# Patient Record
Sex: MALE | Race: WHITE | NOT HISPANIC OR LATINO | Employment: UNEMPLOYED | URBAN - METROPOLITAN AREA
[De-identification: names, ages, dates, MRNs, and addresses within clinical notes are randomized per-mention and may not be internally consistent; named-entity substitution may affect disease eponyms.]

---

## 2017-01-23 ENCOUNTER — COMMUNICATION - HEALTHEAST (OUTPATIENT)
Dept: FAMILY MEDICINE | Facility: CLINIC | Age: 52
End: 2017-01-23

## 2017-02-09 ENCOUNTER — COMMUNICATION - HEALTHEAST (OUTPATIENT)
Dept: FAMILY MEDICINE | Facility: CLINIC | Age: 52
End: 2017-02-09

## 2017-02-09 DIAGNOSIS — J45.909 ASTHMA IN ADULT: ICD-10-CM

## 2017-02-14 ENCOUNTER — COMMUNICATION - HEALTHEAST (OUTPATIENT)
Dept: FAMILY MEDICINE | Facility: CLINIC | Age: 52
End: 2017-02-14

## 2017-02-14 DIAGNOSIS — J45.909 ASTHMA IN ADULT: ICD-10-CM

## 2017-02-16 ENCOUNTER — COMMUNICATION - HEALTHEAST (OUTPATIENT)
Dept: FAMILY MEDICINE | Facility: CLINIC | Age: 52
End: 2017-02-16

## 2017-02-16 DIAGNOSIS — J45.909 ASTHMA IN ADULT: ICD-10-CM

## 2017-02-23 ENCOUNTER — COMMUNICATION - HEALTHEAST (OUTPATIENT)
Dept: FAMILY MEDICINE | Facility: CLINIC | Age: 52
End: 2017-02-23

## 2017-03-09 ENCOUNTER — OFFICE VISIT - HEALTHEAST (OUTPATIENT)
Dept: FAMILY MEDICINE | Facility: CLINIC | Age: 52
End: 2017-03-09

## 2017-03-09 ENCOUNTER — COMMUNICATION - HEALTHEAST (OUTPATIENT)
Dept: TELEHEALTH | Facility: CLINIC | Age: 52
End: 2017-03-09

## 2017-03-09 DIAGNOSIS — J45.909 ASTHMA IN ADULT: ICD-10-CM

## 2017-03-09 DIAGNOSIS — Z51.81 MEDICATION MONITORING ENCOUNTER: ICD-10-CM

## 2017-03-09 DIAGNOSIS — J44.89 CHRONIC AIRWAY OBSTRUCTION, NOT ELSEWHERE CLASSIFIED: ICD-10-CM

## 2017-03-09 DIAGNOSIS — F31.70 BIPOLAR AFFECTIVE DISORDER IN REMISSION (H): ICD-10-CM

## 2017-03-09 ASSESSMENT — MIFFLIN-ST. JEOR: SCORE: 1541.86

## 2017-03-10 ENCOUNTER — COMMUNICATION - HEALTHEAST (OUTPATIENT)
Dept: FAMILY MEDICINE | Facility: CLINIC | Age: 52
End: 2017-03-10

## 2017-03-15 ENCOUNTER — COMMUNICATION - HEALTHEAST (OUTPATIENT)
Dept: FAMILY MEDICINE | Facility: CLINIC | Age: 52
End: 2017-03-15

## 2017-03-15 ENCOUNTER — COMMUNICATION - HEALTHEAST (OUTPATIENT)
Dept: SCHEDULING | Facility: CLINIC | Age: 52
End: 2017-03-15

## 2017-04-05 ENCOUNTER — OFFICE VISIT - HEALTHEAST (OUTPATIENT)
Dept: FAMILY MEDICINE | Facility: CLINIC | Age: 52
End: 2017-04-05

## 2017-04-05 DIAGNOSIS — J44.89 CHRONIC AIRWAY OBSTRUCTION, NOT ELSEWHERE CLASSIFIED: ICD-10-CM

## 2017-04-05 DIAGNOSIS — D72.829 ELEVATED WBC COUNT: ICD-10-CM

## 2017-04-05 DIAGNOSIS — S06.0X1D CONCUSSION, WITH LOSS OF CONSCIOUSNESS OF 30 MINUTES OR LESS, SUBSEQUENT ENCOUNTER: ICD-10-CM

## 2017-04-05 DIAGNOSIS — F41.9 ANXIETY: ICD-10-CM

## 2017-04-05 DIAGNOSIS — J45.909 ASTHMA IN ADULT: ICD-10-CM

## 2017-04-05 DIAGNOSIS — R51.9 HEADACHE: ICD-10-CM

## 2017-04-05 DIAGNOSIS — F31.70 BIPOLAR AFFECTIVE DISORDER IN REMISSION (H): ICD-10-CM

## 2017-04-05 LAB
CHOLEST SERPL-MCNC: 226 MG/DL
FASTING STATUS PATIENT QL REPORTED: YES
HDLC SERPL-MCNC: 59 MG/DL
LDLC SERPL CALC-MCNC: 119 MG/DL
TRIGL SERPL-MCNC: 239 MG/DL

## 2017-04-05 ASSESSMENT — MIFFLIN-ST. JEOR: SCORE: 1554.33

## 2017-07-19 ENCOUNTER — COMMUNICATION - HEALTHEAST (OUTPATIENT)
Dept: FAMILY MEDICINE | Facility: CLINIC | Age: 52
End: 2017-07-19

## 2017-08-15 ENCOUNTER — COMMUNICATION - HEALTHEAST (OUTPATIENT)
Dept: FAMILY MEDICINE | Facility: CLINIC | Age: 52
End: 2017-08-15

## 2017-08-15 DIAGNOSIS — J44.89 CHRONIC AIRWAY OBSTRUCTION, NOT ELSEWHERE CLASSIFIED: ICD-10-CM

## 2017-12-08 ENCOUNTER — RECORDS - HEALTHEAST (OUTPATIENT)
Dept: GENERAL RADIOLOGY | Facility: CLINIC | Age: 52
End: 2017-12-08

## 2017-12-08 ENCOUNTER — COMMUNICATION - HEALTHEAST (OUTPATIENT)
Dept: FAMILY MEDICINE | Facility: CLINIC | Age: 52
End: 2017-12-08

## 2017-12-08 ENCOUNTER — OFFICE VISIT - HEALTHEAST (OUTPATIENT)
Dept: FAMILY MEDICINE | Facility: CLINIC | Age: 52
End: 2017-12-08

## 2017-12-08 DIAGNOSIS — R05.9 COUGH: ICD-10-CM

## 2017-12-08 DIAGNOSIS — J45.909 ASTHMA IN ADULT: ICD-10-CM

## 2017-12-26 ENCOUNTER — COMMUNICATION - HEALTHEAST (OUTPATIENT)
Dept: FAMILY MEDICINE | Facility: CLINIC | Age: 52
End: 2017-12-26

## 2017-12-26 DIAGNOSIS — J45.909 ASTHMA IN ADULT: ICD-10-CM

## 2018-02-15 ENCOUNTER — RECORDS - HEALTHEAST (OUTPATIENT)
Dept: ADMINISTRATIVE | Facility: OTHER | Age: 53
End: 2018-02-15

## 2018-03-08 ENCOUNTER — COMMUNICATION - HEALTHEAST (OUTPATIENT)
Dept: FAMILY MEDICINE | Facility: CLINIC | Age: 53
End: 2018-03-08

## 2018-03-08 DIAGNOSIS — J45.909 ASTHMA: ICD-10-CM

## 2018-05-29 ENCOUNTER — COMMUNICATION - HEALTHEAST (OUTPATIENT)
Dept: FAMILY MEDICINE | Facility: CLINIC | Age: 53
End: 2018-05-29

## 2018-05-29 DIAGNOSIS — J45.909 ASTHMA: ICD-10-CM

## 2018-07-27 ENCOUNTER — COMMUNICATION - HEALTHEAST (OUTPATIENT)
Dept: FAMILY MEDICINE | Facility: CLINIC | Age: 53
End: 2018-07-27

## 2018-08-14 ENCOUNTER — COMMUNICATION - HEALTHEAST (OUTPATIENT)
Dept: FAMILY MEDICINE | Facility: CLINIC | Age: 53
End: 2018-08-14

## 2018-11-08 ENCOUNTER — COMMUNICATION - HEALTHEAST (OUTPATIENT)
Dept: FAMILY MEDICINE | Facility: CLINIC | Age: 53
End: 2018-11-08

## 2018-11-08 DIAGNOSIS — J45.909 ASTHMA: ICD-10-CM

## 2018-11-09 ENCOUNTER — COMMUNICATION - HEALTHEAST (OUTPATIENT)
Dept: FAMILY MEDICINE | Facility: CLINIC | Age: 53
End: 2018-11-09

## 2018-11-09 DIAGNOSIS — J45.909 ASTHMA: ICD-10-CM

## 2018-11-23 ENCOUNTER — COMMUNICATION - HEALTHEAST (OUTPATIENT)
Dept: FAMILY MEDICINE | Facility: CLINIC | Age: 53
End: 2018-11-23

## 2018-11-23 DIAGNOSIS — J45.909 ASTHMA IN ADULT: ICD-10-CM

## 2018-11-23 DIAGNOSIS — J45.909 ASTHMA: ICD-10-CM

## 2018-11-26 ENCOUNTER — OFFICE VISIT - HEALTHEAST (OUTPATIENT)
Dept: FAMILY MEDICINE | Facility: CLINIC | Age: 53
End: 2018-11-26

## 2018-11-26 DIAGNOSIS — J31.0 CHRONIC RHINITIS: ICD-10-CM

## 2018-11-26 DIAGNOSIS — F31.70 BIPOLAR AFFECTIVE DISORDER IN REMISSION (H): ICD-10-CM

## 2018-11-26 DIAGNOSIS — I10 ESSENTIAL HYPERTENSION: ICD-10-CM

## 2018-11-26 DIAGNOSIS — J45.909 ASTHMA: ICD-10-CM

## 2018-11-26 DIAGNOSIS — J44.9 CHRONIC OBSTRUCTIVE PULMONARY DISEASE, UNSPECIFIED COPD TYPE (H): ICD-10-CM

## 2018-11-26 RX ORDER — ALBUTEROL SULFATE 90 UG/1
2 AEROSOL, METERED RESPIRATORY (INHALATION) EVERY 6 HOURS PRN
Qty: 18 G | Refills: 5 | Status: SHIPPED | OUTPATIENT
Start: 2018-11-26 | End: 2022-05-19

## 2018-11-26 ASSESSMENT — MIFFLIN-ST. JEOR: SCORE: 1609.61

## 2018-12-16 ENCOUNTER — COMMUNICATION - HEALTHEAST (OUTPATIENT)
Dept: FAMILY MEDICINE | Facility: CLINIC | Age: 53
End: 2018-12-16

## 2018-12-16 DIAGNOSIS — J45.909 ASTHMA IN ADULT: ICD-10-CM

## 2019-01-24 ENCOUNTER — COMMUNICATION - HEALTHEAST (OUTPATIENT)
Dept: SCHEDULING | Facility: CLINIC | Age: 54
End: 2019-01-24

## 2019-04-13 ENCOUNTER — COMMUNICATION - HEALTHEAST (OUTPATIENT)
Dept: FAMILY MEDICINE | Facility: CLINIC | Age: 54
End: 2019-04-13

## 2019-04-13 DIAGNOSIS — J45.909 ASTHMA: ICD-10-CM

## 2019-06-12 ENCOUNTER — COMMUNICATION - HEALTHEAST (OUTPATIENT)
Dept: FAMILY MEDICINE | Facility: CLINIC | Age: 54
End: 2019-06-12

## 2019-07-05 ENCOUNTER — COMMUNICATION - HEALTHEAST (OUTPATIENT)
Dept: SCHEDULING | Facility: CLINIC | Age: 54
End: 2019-07-05

## 2019-07-05 ENCOUNTER — COMMUNICATION - HEALTHEAST (OUTPATIENT)
Dept: FAMILY MEDICINE | Facility: CLINIC | Age: 54
End: 2019-07-05

## 2019-07-06 ENCOUNTER — COMMUNICATION - HEALTHEAST (OUTPATIENT)
Dept: SCHEDULING | Facility: CLINIC | Age: 54
End: 2019-07-06

## 2019-07-09 ENCOUNTER — OFFICE VISIT - HEALTHEAST (OUTPATIENT)
Dept: FAMILY MEDICINE | Facility: CLINIC | Age: 54
End: 2019-07-09

## 2019-07-09 DIAGNOSIS — J20.9 ACUTE BRONCHITIS, UNSPECIFIED ORGANISM: ICD-10-CM

## 2019-07-09 DIAGNOSIS — J45.909 ASTHMA IN ADULT: ICD-10-CM

## 2019-07-09 DIAGNOSIS — I10 ESSENTIAL HYPERTENSION: ICD-10-CM

## 2019-07-09 DIAGNOSIS — J44.1 CHRONIC OBSTRUCTIVE PULMONARY DISEASE WITH ACUTE EXACERBATION (H): ICD-10-CM

## 2019-07-09 RX ORDER — BUDESONIDE AND FORMOTEROL FUMARATE DIHYDRATE 160; 4.5 UG/1; UG/1
AEROSOL RESPIRATORY (INHALATION)
Qty: 1 INHALER | Refills: 5 | Status: SHIPPED | OUTPATIENT
Start: 2019-07-09 | End: 2022-05-19

## 2019-12-15 ENCOUNTER — COMMUNICATION - HEALTHEAST (OUTPATIENT)
Dept: FAMILY MEDICINE | Facility: CLINIC | Age: 54
End: 2019-12-15

## 2019-12-15 DIAGNOSIS — J45.909 ASTHMA: ICD-10-CM

## 2019-12-17 ENCOUNTER — COMMUNICATION - HEALTHEAST (OUTPATIENT)
Dept: FAMILY MEDICINE | Facility: CLINIC | Age: 54
End: 2019-12-17

## 2019-12-17 ENCOUNTER — COMMUNICATION - HEALTHEAST (OUTPATIENT)
Dept: SCHEDULING | Facility: CLINIC | Age: 54
End: 2019-12-17

## 2019-12-17 DIAGNOSIS — J45.909 ASTHMA IN ADULT: ICD-10-CM

## 2020-11-24 ENCOUNTER — OFFICE VISIT - HEALTHEAST (OUTPATIENT)
Dept: FAMILY MEDICINE | Facility: CLINIC | Age: 55
End: 2020-11-24

## 2020-11-24 DIAGNOSIS — Z02.4 ENCOUNTER FOR COMMERCIAL DRIVER MEDICAL EXAMINATION (CDME): ICD-10-CM

## 2020-11-24 DIAGNOSIS — H53.8 BLURRED VISION: ICD-10-CM

## 2020-11-24 DIAGNOSIS — I10 ESSENTIAL HYPERTENSION: ICD-10-CM

## 2020-11-24 DIAGNOSIS — J44.1 CHRONIC OBSTRUCTIVE PULMONARY DISEASE WITH ACUTE EXACERBATION (H): ICD-10-CM

## 2020-11-24 DIAGNOSIS — R82.90 ABNORMAL URINALYSIS: ICD-10-CM

## 2020-11-24 DIAGNOSIS — G47.33 OBSTRUCTIVE SLEEP APNEA (ADULT) (PEDIATRIC): ICD-10-CM

## 2020-11-24 LAB
ALBUMIN UR-MCNC: ABNORMAL MG/DL
APPEARANCE UR: CLEAR
BACTERIA #/AREA URNS HPF: ABNORMAL HPF
BILIRUB UR QL STRIP: ABNORMAL
COLOR UR AUTO: ABNORMAL
GLUCOSE UR STRIP-MCNC: NEGATIVE MG/DL
HGB UR QL STRIP: ABNORMAL
KETONES UR STRIP-MCNC: ABNORMAL MG/DL
LEUKOCYTE ESTERASE UR QL STRIP: NEGATIVE
MUCOUS THREADS #/AREA URNS LPF: ABNORMAL LPF
NITRATE UR QL: NEGATIVE
PH UR STRIP: 5.5 [PH] (ref 5–8)
RBC #/AREA URNS AUTO: ABNORMAL HPF
SP GR UR STRIP: 1.02 (ref 1–1.03)
SQUAMOUS #/AREA URNS AUTO: ABNORMAL LPF
UROBILINOGEN UR STRIP-ACNC: ABNORMAL
WBC #/AREA URNS AUTO: ABNORMAL HPF

## 2020-11-24 ASSESSMENT — MIFFLIN-ST. JEOR: SCORE: 1579.95

## 2020-11-26 LAB — BACTERIA SPEC CULT: NO GROWTH

## 2021-01-20 ENCOUNTER — COMMUNICATION - HEALTHEAST (OUTPATIENT)
Dept: FAMILY MEDICINE | Facility: CLINIC | Age: 56
End: 2021-01-20

## 2021-01-20 DIAGNOSIS — J45.909 ASTHMA IN ADULT: ICD-10-CM

## 2021-05-17 ENCOUNTER — OFFICE VISIT - HEALTHEAST (OUTPATIENT)
Dept: FAMILY MEDICINE | Facility: CLINIC | Age: 56
End: 2021-05-17

## 2021-05-17 DIAGNOSIS — J45.909 UNCOMPLICATED ASTHMA, UNSPECIFIED ASTHMA SEVERITY, UNSPECIFIED WHETHER PERSISTENT: ICD-10-CM

## 2021-05-17 DIAGNOSIS — J45.909 ASTHMA: ICD-10-CM

## 2021-05-17 DIAGNOSIS — I10 ESSENTIAL HYPERTENSION: ICD-10-CM

## 2021-05-17 RX ORDER — LISINOPRIL 10 MG/1
10 TABLET ORAL DAILY
Qty: 90 TABLET | Refills: 3 | Status: SHIPPED | OUTPATIENT
Start: 2021-05-17 | End: 2022-05-19

## 2021-05-17 RX ORDER — PREDNISONE 10 MG/1
TABLET ORAL
Qty: 30 TABLET | Refills: 0 | Status: SHIPPED | OUTPATIENT
Start: 2021-05-17 | End: 2022-05-19

## 2021-05-17 RX ORDER — ALBUTEROL SULFATE 0.83 MG/ML
SOLUTION RESPIRATORY (INHALATION)
Qty: 450 ML | Refills: 5 | Status: SHIPPED | OUTPATIENT
Start: 2021-05-17 | End: 2022-05-19

## 2021-05-17 ASSESSMENT — MIFFLIN-ST. JEOR: SCORE: 1553.2

## 2021-05-24 ENCOUNTER — RECORDS - HEALTHEAST (OUTPATIENT)
Dept: ADMINISTRATIVE | Facility: CLINIC | Age: 56
End: 2021-05-24

## 2021-05-27 ENCOUNTER — RECORDS - HEALTHEAST (OUTPATIENT)
Dept: ADMINISTRATIVE | Facility: CLINIC | Age: 56
End: 2021-05-27

## 2021-05-27 VITALS
HEIGHT: 67 IN | OXYGEN SATURATION: 96 % | WEIGHT: 167 LBS | HEART RATE: 108 BPM | DIASTOLIC BLOOD PRESSURE: 100 MMHG | SYSTOLIC BLOOD PRESSURE: 148 MMHG | BODY MASS INDEX: 26.21 KG/M2

## 2021-05-27 NOTE — TELEPHONE ENCOUNTER
Refill Approved    Rx renewed per Medication Renewal Policy. Medication was last renewed on 11/26/18.    Mary Santos, [facility] Triage/Med Refill 4/15/2019     Requested Prescriptions   Pending Prescriptions Disp Refills     albuterol (PROVENTIL) 2.5 mg /3 mL (0.083 %) nebulizer solution [Pharmacy Med Name: ALBUTEROL 0.083%(2.5MG/3ML) 25X3ML] 375 mL 0     Sig: NEBULIZE 1 VIAL EVERY 4 HOURS AS NEEDED       Albuterol/Levalbuterol Refill Protocol Passed - 4/13/2019  3:43 PM        Passed - PCP or prescribing provider visit in last year     Last office visit with prescriber/PCP: 11/26/2018 Nelida Ivory MD OR same dept: 11/26/2018 Nelida Ivory MD OR same specialty: 11/26/2018 Nelida Ivory MD Last physical: Visit date not found       Next appt within 3 mo: Visit date not found  Next physical within 3 mo: Visit date not found  Prescriber OR PCP: Nelida Ivory MD  Last diagnosis associated with med order: 1. Asthma  - albuterol (PROVENTIL) 2.5 mg /3 mL (0.083 %) nebulizer solution [Pharmacy Med Name: ALBUTEROL 0.083%(2.5MG/3ML) 25X3ML]; NEBULIZE 1 VIAL EVERY 4 HOURS AS NEEDED  Dispense: 375 mL; Refill: 0    If protocol passes may refill for 6 months if within 3 months of last provider visit (or a total of 9 months). If patient requesting >1 inhaler per month refill x 6 months and have patient make appointment with provider.

## 2021-05-28 ASSESSMENT — ASTHMA QUESTIONNAIRES: ACT_TOTALSCORE: 17

## 2021-05-29 NOTE — TELEPHONE ENCOUNTER
Medication Request  Medication name:     OLANZapine (ZYPREXA) 7.5 MG tablet (Discontinued) 30 tablet 1 4/5/2017 11/26/2018 No   Sig - Route: Take 1 tablet (7.5 mg total) by mouth at bedtime. - Oral   Reason for Discontinue: Therapy completed   E-Prescribing Status: Receipt confirmed by pharmacy (4/5/2017 10:13 AM CDT)       Pharmacy Name and Location: [facility] [address] [place] [address]   Reason for request: The patient would like the above prescription. The patient states that he is having a hard time affording the script so he has been cutting the medication in half. The patient would like an expedited refill request.   When did you use medication last?:  6/11/2019  Patient offered appointment:  The patient is willing to be seen though would like to have the refilled without if possible.   Okay to leave a detailed message: yes

## 2021-05-29 NOTE — TELEPHONE ENCOUNTER
Pt is due for appt. BP high last visit in Nov 2018. Nothing discussed about this medication. Tried to call without success. Line busy. Will try again later. H.DeGree, CMA

## 2021-05-30 ENCOUNTER — RECORDS - HEALTHEAST (OUTPATIENT)
Dept: ADMINISTRATIVE | Facility: CLINIC | Age: 56
End: 2021-05-30

## 2021-05-30 VITALS — WEIGHT: 169 LBS | BODY MASS INDEX: 27.16 KG/M2 | HEIGHT: 66 IN

## 2021-05-30 VITALS — HEIGHT: 66 IN | BODY MASS INDEX: 26.72 KG/M2 | WEIGHT: 166.25 LBS

## 2021-05-30 NOTE — PROGRESS NOTES
Assessment:        1. Acute bronchitis, unspecified organism  doxycycline (VIBRAMYCIN) 100 MG capsule   2. Chronic obstructive pulmonary disease with acute exacerbation (H)     3. Asthma in adult  budesonide-formoterol (SYMBICORT) 160-4.5 mcg/actuation inhaler    albuterol (PROVENTIL) 2.5 mg /3 mL (0.083 %) nebulizer solution             Plan:            We reviewed the likely/potential etiology(ies) for his respiratory symptoms and we will cover with doxycycline as directed, zyrtec daily when having nasal d/c, and I also sent a new Rx for symbicort to be used daily as directed. I also renewed his albuterol neb solution. We discussed the treatment goals of asthma and I stressed the importance of controller medication use and I gave him some information regarding prescription assistance. I also stressed the importance of regular follow up at least every 6-12 mos. We reviewed use of OTC analgesics as well as increased fluids and rest, and he will call or return to clinic with any ongoing or worsening symptoms. As far as his hypertension, he will continue the lisinopril and I would like him to follow up in the next 3-4 wks for BP check. We discussed the benefits of smoking cessation and the treatment options available. He will consider these and contact me if he needs help quitting. He will otherwise will f/u in  6-12 mos for routine med check/evaluation.         Subjective:          Onel Valdez is a 54 y.o. male with known COPD and asthma who presents with exacerbation. Current symptoms include: acute dyspnea and cough productive of yellow and green sputum in moderate amounts. Symptoms began 4 days ago. Patient currently is not on home oxygen therapy. He does not have insurance currently, so was reluctant to come in, but he is having difficulty getting to work due to the symptoms. Associated symptoms include fevers, poor exercise tolerance and clear nasal drainage. This episode appears to have been triggered by  no identifiable factor. Treatments tried for the current exacerbation: albuterol inhaler and albuterol nebulizer. The patient has been having similar episodes for several years. He does have intermittent allergy symptoms, and takes benadryl as needed, but has not taken anything recently.       He also has a history of hypertension and has been taking lisinopril as directed. He denies any chest pain, palpitations, or lower extremity edema.     The following portions of the patient's history were reviewed and updated as appropriate: allergies, current medications, past family history, past medical history, past social history, past surgical history and problem list.    Review of Systems  A 12 point comprehensive review of systems was negative except as noted.       Objective:         BP (!) 142/104 (Patient Position: Sitting, Cuff Size: Adult Large)   Pulse (!) 114   Wt 163 lb 5 oz (74.1 kg)   SpO2 95%   BMI 26.36 kg/m    General     Alert, cooperative, no distress   Head:    Normocephalic, without obvious abnormality, atraumatic   Eyes:    PERRL, conjunctiva/corneas clear, EOM's intact   Ears:    Normal TM's and external ear canals, both ears   Nose:   Nasal, mucosa mildly congested, clear drainage   without sinus tenderness   Throat:   Oropharynx is mildly erythematous with moist mucous membranes.    Neck:   Supple, no adenopathy and supple, symmetrical, trachea midline    Lungs:     diminished breath sounds bilaterally with scattered expiratory wheezes.   Heart:    Regular rate and rhythm, no murmur, rub or gallop   Abdomen:     Soft, non-tender, no masses   Skin:   Skin color, texture, turgor normal, no rashes or lesions

## 2021-05-30 NOTE — TELEPHONE ENCOUNTER
"Attempted to call patient and relay message to patient but number was \"not available\".     Sanaz Louie, [facility]    "

## 2021-05-30 NOTE — TELEPHONE ENCOUNTER
RN cannot approve Refill Request    RN can NOT refill this medication med is not covered by policy/route to provider. Last office visit: 11/26/2018 Nelida Ivory MD Last Physical: Visit date not found Last MTM visit: Visit date not found Last visit same specialty: 11/26/2018 Nelida Ivory MD.  Next visit within 3 mo: Visit date not found  Next physical within 3 mo: Visit date not found      Larissa Jarquin, [facility] Triage/Med Refill 7/5/2019    Requested Prescriptions   Pending Prescriptions Disp Refills     doxycycline (VIBRAMYCIN) 100 MG capsule [Pharmacy Med Name: DOXYCYCLINE HYCLATE 100MG CAPSULES] 20 capsule 0     Sig: TAKE 1 CAPSULE BY MOUTH TWICE DAILY FOR 10 DAYS. TAKE WITH FOOD       There is no refill protocol information for this order

## 2021-05-30 NOTE — TELEPHONE ENCOUNTER
Call from pt       CC: Calling about re ABX         A/P:   > I related message from Dr Ivory, directing him to be seen for attention of this       Noted [facility] and location / hours       He will head there for care           Hima Moe RN   Triage and Medication Refills

## 2021-05-30 NOTE — PATIENT INSTRUCTIONS - HE
Recommend daily zyrtec or claritin during allergy season (spring and fall)    Add symbicort twice daily as a preventive medication - contact prescriprion assistance to check on reduced cost/eva, etc    [place] [facility]  [phone]

## 2021-05-30 NOTE — TELEPHONE ENCOUNTER
He really should be evaluated for this. Could go to [facility] or urgent care if he can't come here.

## 2021-05-30 NOTE — TELEPHONE ENCOUNTER
"RN Assessment/Reason for Call:   Okay to leave Detailed Message  Patient calling in, ants an antibiotic, \" wants him to be seen.\"  He gets Pneumonia 2 x a year.  Hx of Asthma and COPD;  'Chest is sore. Green and thick and turning yellow.\"  Offered [facility] and [facility] clinic Monday thru Friday.  Offered payment sytem with no insurance. Offered [facility] for insurance.    RN Action/Disposition:   recommends see Dr in 24 hrs.  Call back if worse symptoms  Discussed home care measures.  He ill go to [facility]; Agrees to plan.     Niecy Newby, MABEL    [facility] Triage/med refill  7/6/2019  10:37 AM      "

## 2021-05-30 NOTE — TELEPHONE ENCOUNTER
Refill antibiotics.    Coughing up green phlegm.  No blood coming up.  He states he gets this antibiotic without an   Appointment twice a year.    He is afraid it will lead to Pneumonia.    He reports he is just coughing a lot.    He states Dr. Ivory gave him antibiotics the last time without an appointment. He reports, He has NO insurance.    He says [facility] in [facility] is listed in chart.    Please advise.    Kesha Hawk RN  [facility] Triage/refill nurse

## 2021-05-31 ENCOUNTER — RECORDS - HEALTHEAST (OUTPATIENT)
Dept: ADMINISTRATIVE | Facility: CLINIC | Age: 56
End: 2021-05-31

## 2021-05-31 VITALS — BODY MASS INDEX: 25.34 KG/M2 | WEIGHT: 157 LBS

## 2021-06-02 VITALS — WEIGHT: 181.19 LBS | HEIGHT: 66 IN | BODY MASS INDEX: 29.12 KG/M2

## 2021-06-03 VITALS — WEIGHT: 163.31 LBS | BODY MASS INDEX: 26.36 KG/M2

## 2021-06-04 NOTE — TELEPHONE ENCOUNTER
Refill Approved    Rx renewed per Medication Renewal Policy. Medication was last renewed on 11/26/18.    Mary Santos, [facility] Triage/Med Refill 12/16/2019     Requested Prescriptions   Pending Prescriptions Disp Refills     albuterol (PROVENTIL) 2.5 mg /3 mL (0.083 %) nebulizer solution [Pharmacy Med Name: ALBUTEROL 0.083%(2.5MG/3ML) 25X3ML] 450 mL 0     Sig: NEBULIZE 3 ML( 2.5MG TOTAL) EVERY 4 HOURS AS NEEDED.       Albuterol/Levalbuterol Refill Protocol Passed - 12/15/2019  4:17 AM        Passed - PCP or prescribing provider visit in last year     Last office visit with prescriber/PCP: 7/9/2019 Nelida Ivory MD OR same dept: 7/9/2019 Nelida Ivory MD OR same specialty: 7/9/2019 Nelida Ivory MD Last physical: Visit date not found       Next appt within 3 mo: Visit date not found  Next physical within 3 mo: Visit date not found  Prescriber OR PCP: Nelida Ivory MD  Last diagnosis associated with med order: 1. Asthma  - albuterol (PROVENTIL) 2.5 mg /3 mL (0.083 %) nebulizer solution [Pharmacy Med Name: ALBUTEROL 0.083%(2.5MG/3ML) 25X3ML]; NEBULIZE 3 ML( 2.5MG TOTAL) EVERY 4 HOURS AS NEEDED.  Dispense: 450 mL; Refill: 0    If protocol passes may refill for 6 months if within 3 months of last provider visit (or a total of 9 months). If patient requesting >1 inhaler per month refill x 6 months and have patient make appointment with provider.

## 2021-06-05 VITALS
OXYGEN SATURATION: 95 % | HEIGHT: 67 IN | DIASTOLIC BLOOD PRESSURE: 100 MMHG | WEIGHT: 174 LBS | BODY MASS INDEX: 27.31 KG/M2 | SYSTOLIC BLOOD PRESSURE: 170 MMHG | HEART RATE: 101 BPM | TEMPERATURE: 98.2 F

## 2021-06-09 NOTE — PROGRESS NOTES
OV  3/9/2017  Assessment:        1. Chronic Obstructive Pulmonary Disease     2. Asthma in adult  budesonide-formoterol (SYMBICORT) 160-4.5 mcg/actuation inhaler    ipratropium-albuterol (DUO-NEB) 0.5-2.5 mg/3 mL nebulizer   3. Bipolar affective disorder in remission     4. Medication monitoring encounter  HM2(CBC w/o Differential)    Comprehensive Metabolic Panel        Plan:        We reviewed the etiology and natural history for bipolar disorder and options for treatment. We elected to resume the zyprexa at this time.  Labs were drawn for medication monitoring as noted. As far as the therapy dog certification, the sites that I was able to review state that the certification needs to come from a mental health provider, and he will schedule an appt for that. As far as his asthma/COPD symptoms, he will continue his same medications and we sent an order for a nebulizer. We reviewed indications for urgent evaluation and he will call or return to clinic. He should f/u in 6-12 mos for medication check.      Subjective:           Onel Valdez is a 51 y.o. male who presents for follow up of bipolar disorder and anxiety. Onset of symptoms was several years ago. Symptoms have been fairly well-controlled since that time, most recently on zyprexa which was managed by providers at [facility]. He has been off the zyprexa for about a week now and would like to resume it. Current symptoms include depression, anhedonia, insomnia, difficulty concentrating, anxiety, feelings of losing control and racing thoughts. Patient denies recurrent thoughts of death. He denies current suicidal and homicidal ideation. His other concern is to get his dog certified as a therapy dog, and he need a letter for that.       Little interest or pleasure in doing things: More than half the days  Feeling down, depressed, or hopeless: More than half the days  Trouble falling or staying asleep, or sleeping too much: More than half the  "days  Feeling tired or having little energy: More than half the days  Poor appetite or overeating: More than half the days  Feeling bad about yourself - or that you are a failure or have let yourself or your family down: More than half the days  Trouble concentrating on things, such as reading the newspaper or watching television: More than half the days  Moving or speaking so slowly that other people could have noticed. Or the opposite - being so fidgety or restless that you have been moving around a lot more than usual: More than half the days  Thoughts that you would be better off dead, or of hurting yourself in some way: Several days  PHQ-9 Total Score: 17  If you checked off any problems, how difficult have these problems made it for you to do your work, take care of things at home, or get along with other people?: Somewhat difficult        Patient also presents for COPD without exacerbation. Current symptoms include: chronic dyspnea and cough productive of brown sputum in small amounts. Symptoms began several years ago, and have been stable for some time now. He denies chills, fever and increased sputum. Patient currently is not on home oxygen therapy. He is currently on duonebs, symbicort and albuterol MDI prn. Breathing stable, using nebs regularly, needs new neb machine.     Review of Systems  Pertinent items are noted in HPI.       Objective:     Visit Vitals     /82     Pulse 88     Ht 5' 6\" (1.676 m)     Wt 166 lb 4 oz (75.4 kg)     BMI 26.83 kg/m2     Physical Exam:  GEN: Alert and oriented, NAD,  well nourished  SKIN:  Normal skin turgor, no lesions/rashes   HEENT: moist mucous membranes, no rhinorrhea.    NECK: Normal.  No adenopathy or thyromegaly.  CV: Regular rate and rhythm, no murmurs.   LUNGS: Clear to auscultation bilaterally.    ABDOMEN: Soft, non-tender, non-distended, no masses   EXTREMITY: No edema, cyanosis  NEURO: Grossly normal.     Mental Status Examination:  Dress, grooming, " personal hygiene: Appropriate  Speech: Appropriate  Mood: Appropriate     Results for orders placed or performed in visit on 03/09/17   HM2(CBC w/o Differential)   Result Value Ref Range    WBC 14.4 (H) 4.0 - 11.0 thou/uL    RBC 5.03 4.40 - 6.20 mill/uL    Hemoglobin 16.5 14.0 - 18.0 g/dL    Hematocrit 49.6 40.0 - 54.0 %    MCV 99 80 - 100 fL    MCH 32.9 27.0 - 34.0 pg    MCHC 33.3 32.0 - 36.0 g/dL    RDW 11.4 11.0 - 14.5 %    Platelets 221 140 - 440 thou/uL    MPV 7.3 7.0 - 10.0 fL   Comprehensive Metabolic Panel   Result Value Ref Range    Sodium 141 136 - 145 mmol/L    Potassium 4.3 3.5 - 5.0 mmol/L    Chloride 104 98 - 107 mmol/L    CO2 28 22 - 31 mmol/L    Anion Gap, Calculation 9 5 - 18 mmol/L    Glucose 87 70 - 125 mg/dL    BUN 17 8 - 22 mg/dL    Creatinine 0.89 0.70 - 1.30 mg/dL    GFR MDRD Af Amer >60 >60 mL/min/1.73m2    GFR MDRD Non Af Amer >60 >60 mL/min/1.73m2    Bilirubin, Total 0.4 0.0 - 1.0 mg/dL    Calcium 10.0 8.5 - 10.5 mg/dL    Protein, Total 7.3 6.0 - 8.0 g/dL    Albumin 4.3 3.5 - 5.0 g/dL    Alkaline Phosphatase 74 45 - 120 U/L    AST 21 0 - 40 U/L    ALT 20 0 - 45 U/L

## 2021-06-09 NOTE — PROGRESS NOTES
"Onel Valdez is a 51-year-old with a history of COPD and ongoing tobacco use bipolar disorder/anxiety and recent elevations in blood pressure with obstructive sleep apnea and recent fall with loss of consciousness who presents today for follow-up.  Please see previous notes labs and scans.  Also brings me in notes from his therapist.  He typically is seeing his therapist once a week.  He feels going to therapy is helping him.  He is not sure his medication is helping enough as he continues to have a sense of anxiety and difficulty sleeping.  He has a hard time shutting his brain off at night.  He has a 6-year-old dog that he considers a therapy dog.    He continues to have a sense of headache and some dizziness with decreased focus and concentration.  While it is somewhat unclear whether this stems from his concussion that seems very likely.  He has had some headache prior to the injury and is not sure how much of this is worsened by his new injury.  He denies any new neurosensory symptoms.    We reviewed heart disease prevention and cancer detection.  Discussed his upcoming birthday and the need for further cancer screening.  He has been told his blood pressure is been high in the past and with his family history of stroke and heart disease we discussed treating this.  I have also urged him to stop smoking.  He tells me he is weaning down from 2 packs a day to a quarter of pack a day.  I encouraged him to continue ultimately stop smoking.  He is not doing much in the way of walking or exercise.  He is currently unemployed.    Objective:BP (!) 168/102  Pulse 88  Resp 18  Ht 5' 6\" (1.676 m)  Wt 169 lb (76.7 kg)  BMI 27.28 kg/m2  Ears are normal nose is clear mouth appears normal neck supple no lymphadenopathy lungs are clear heart with regular rate and rhythm without murmur rub gallop normal S1-S2 lower extremities are free of edema neurological examination is nonfocal with the exception of a slightly " wavering Romberg  Mental status he does come across as being somewhat anxious and we discussed the idea of increasing his olanzapine from 5 mg to 7.5 mg a day while continuing his counseling and I encouraged him exercise and stress management.    Labs pending    Assessment: Concussion with associated headache and dizziness bipolar disorder with anxiety COPD with ongoing tobacco use history of elevated white count    Plan: We will call him with labs when available okay for tetanus update I have given him a refill on his inhalers and I will start him on lisinopril 10 mg a day will increase his olanzapine to 7.5 mg a day he is to continue with counseling start exercise and follow-up here in 1 month or as needed

## 2021-06-13 NOTE — PROGRESS NOTES
"Onel was seen today for 's license exam.    Diagnoses and all orders for this visit:    Encounter for  medical examination (CDME)  -     Urinalysis-UC if Indicated  Passed whisper test  Passed traffic color test  Failed vision screen  Urinalysis was abnormal  I will defer to the forms for more detail.  In summary, Onel Valdez did not meets standards due to elevated blood pressure and failed vision screen    Essential hypertension  Advised increase in lisinopril dose to 20mg  We helped him with an appointment with PCP next week    Blurred vision  -     Ambulatory referral to Ophthalmology  Right eye of 10/40    Abnormal UA  Check urine culture    Chronic obstructive pulmonary disease with acute exacerbation (H), nicotine use  Smoking cessation counseled    Obstructive Sleep Apnea  No longer using CPAP      Subjective:  Onel Valdez is a 55 y.o. male here for  fitness determination.  This is a new certification    BP is elevated today  Review of his chart showed elevated BP over the last 2 years  Currently on lisinopril 10mg    Right eye injury s/p lens implantation years ago  Doesn't use corrective lens  Can't see too well on right eye    Smokes 1/2 ppd  Has COPD  Takes symbicort    Stopped CPAP, \"I don't need it\"    Patient Active Problem List    Diagnosis Date Noted     Anxiety 04/05/2017     Concussion with loss of consciousness of 30 minutes or less, subsequent encounter 04/05/2017     Essential hypertension 04/05/2017     Bipolar affective disorder in remission (H) 03/09/2017     Multiple fractures of ribs of left side 01/06/2015     Clavicle fracture 01/06/2015     Chronic obstructive pulmonary disease with acute exacerbation (H)      Asthma      Foot Pain (Soft Tissue)      Chronic Rhinitis      Headache      Obstructive Sleep Apnea         Current Outpatient Medications on File Prior to Visit   Medication Sig Dispense Refill     albuterol (PROAIR " HFA;PROVENTIL HFA;VENTOLIN HFA) 90 mcg/actuation inhaler Inhale 2 puffs every 6 (six) hours as needed for wheezing. 18 g 5     albuterol (PROVENTIL) 2.5 mg /3 mL (0.083 %) nebulizer solution NEBULIZE 3 ML( 2.5MG TOTAL) EVERY 4 HOURS AS NEEDED. 450 mL 0     albuterol (PROVENTIL) 2.5 mg /3 mL (0.083 %) nebulizer solution Take 3 mL (2.5 mg total) by nebulization every 4 (four) hours as needed for wheezing. 450 mL 5     budesonide-formoterol (SYMBICORT) 160-4.5 mcg/actuation inhaler TAKE 2 PUFFS BY MOUTH TWICE DAILY, THEN RINSE MOUTH AFTERWARDS 1 Inhaler 5     lisinopril (PRINIVIL,ZESTRIL) 10 MG tablet Take 1 tablet (10 mg total) by mouth daily OVERDUE FOR VISIT. PLEASE SCHEDULE MED CHECK PRIOR TO NEXT FILL.. 90 tablet 3     No current facility-administered medications on file prior to visit.       Allergies   Allergen Reactions     Amoxicillin Hives     Erythromycin      Current Outpatient Medications   Medication Sig Dispense Refill     albuterol (PROAIR HFA;PROVENTIL HFA;VENTOLIN HFA) 90 mcg/actuation inhaler Inhale 2 puffs every 6 (six) hours as needed for wheezing. 18 g 5     albuterol (PROVENTIL) 2.5 mg /3 mL (0.083 %) nebulizer solution NEBULIZE 3 ML( 2.5MG TOTAL) EVERY 4 HOURS AS NEEDED. 450 mL 0     albuterol (PROVENTIL) 2.5 mg /3 mL (0.083 %) nebulizer solution Take 3 mL (2.5 mg total) by nebulization every 4 (four) hours as needed for wheezing. 450 mL 5     budesonide-formoterol (SYMBICORT) 160-4.5 mcg/actuation inhaler TAKE 2 PUFFS BY MOUTH TWICE DAILY, THEN RINSE MOUTH AFTERWARDS 1 Inhaler 5     lisinopril (PRINIVIL,ZESTRIL) 10 MG tablet Take 1 tablet (10 mg total) by mouth daily OVERDUE FOR VISIT. PLEASE SCHEDULE MED CHECK PRIOR TO NEXT FILL.. 90 tablet 3     No current facility-administered medications for this visit.      Past Medical History:   Diagnosis Date     Asthma      Cataract      COPD (chronic obstructive pulmonary disease) (H)      NNEKA (obstructive sleep apnea)      Sleep apnea, obstructive       Tobacco use      Past Surgical History:   Procedure Laterality Date     CATARACT EXTRACTION       CLAVICLE SURGERY      fixation for fracture     Social History     Socioeconomic History     Marital status:      Spouse name: None     Number of children: None     Years of education: None     Highest education level: None   Occupational History     None   Social Needs     Financial resource strain: None     Food insecurity     Worry: None     Inability: None     Transportation needs     Medical: None     Non-medical: None   Tobacco Use     Smoking status: Current Every Day Smoker     Packs/day: 1.00     Smokeless tobacco: Never Used   Substance and Sexual Activity     Alcohol use: Yes     Drug use: No     Sexual activity: None   Lifestyle     Physical activity     Days per week: None     Minutes per session: None     Stress: None   Relationships     Social connections     Talks on phone: None     Gets together: None     Attends Spiritism service: None     Active member of club or organization: None     Attends meetings of clubs or organizations: None     Relationship status: None     Intimate partner violence     Fear of current or ex partner: None     Emotionally abused: None     Physically abused: None     Forced sexual activity: None   Other Topics Concern     None   Social History Narrative     None     Family History   Problem Relation Age of Onset     Diabetes Mother      Stroke Mother      Heart disease Father      Sleep apnea Father      Seizures Brother      Stroke Brother          Objective:    Physical Exam   Vitals:    11/24/20 1509   BP: (!) 170/100   Pulse:    Temp:    SpO2:       Constitutional: Patient is oriented to person, place, and time. Patient appears well-developed and well-nourished. No distress.   Head: Normocephalic and atraumatic.   Right Ear: External ear normal.   Left Ear: External ear normal.   Eyes: Conjunctivae and EOM are normal. Right eye exhibits no discharge. Left eye  exhibits no discharge. No scleral icterus.   Skin: No rash noted. Patient is not diaphoretic. No erythema. No pallor.    Results for orders placed or performed in visit on 11/24/20   Urinalysis-UC if Indicated   Result Value Ref Range    Color, UA Ani (!) Colorless, Yellow, Straw, Light Yellow    Clarity, UA Clear Clear    Glucose, UA Negative Negative    Bilirubin, UA Small (!) Negative    Ketones, UA 40 mg/dL (!) Negative    Specific Gravity, UA 1.025 1.005 - 1.030    Blood, UA Trace (!) Negative    pH, UA 5.5 5.0 - 8.0    Protein, UA Trace (!) Negative mg/dL    Urobilinogen, UA 0.2 E.U./dL 0.2 E.U./dL, 1.0 E.U./dL    Nitrite, UA Negative Negative    Leukocytes, UA Negative Negative    Bacteria, UA Few (!) None Seen hpf    RBC, UA 0-2 None Seen, 0-2 hpf    WBC, UA 0-5 None Seen, 0-5 hpf    Squam Epithel, UA 0-5 None Seen, 0-5 lpf    Mucus, UA Moderate (!) None Seen lpf

## 2021-06-14 NOTE — PROGRESS NOTES
"ASSESSMENT/PLAN:  Pleasant 52 y.o.  male presents with constellation of flu like symptoms.  Rapid influenza was negative.  CXR was read as hyperexpansion of the lungs without focal consolidation.  Examination revealed slighty erythema of both tympanic membrane and significant peritonsillar erythema.  I suspect he may have some element of sinusitis superimposed on chronic bronchitis/COPD.  I will give him levaquin.  The patient may continue with OTC symptomatic treatment.  Rest, hydration, nutritional support, and time were counseled.  Follow up with primary care provider if the patient is not better in 1-2 weeks.  Smoking cessation counseled.  The patient verbalized understanding and agreed with the plan.    Cough, feverish, myalgia, malaise  -     Influenza A/B Rapid Test  -     XR Chest 2 Views; Future        - levaquin 500mg once daily    SUBJECTIVE:    Onel Valdez is a 52 y.o. male who comes in today for 2-3 days of feeling feverish, semiproductive cough, hot/cold, chills, myalgia, malaise, nausea, vomiting, loose stools, \"woozy\" like wanting to pass out.  He is a smoker with 1/2ppd (from 4 packs per day).  He has COPD, on symbicort and prn GERSON and anticholinergic.  Denies chest pain or shortness of breath.  Denies rash, hemoptysis but has had 10 lbs weight loss over the year.  He has had many sick exposure at work with co-workers having the flu.  No recent travel.    Review of Systems (except those mentioned above)  Constitutional: Negative.   HENT: Negative.   Eyes: Negative.   Respiratory: Negative.   Cardiovascular: Negative.   Gastrointestinal: Negative.   Endocrine: Negative.   Genitourinary: Negative.   Musculoskeletal: Negative.   Skin: Negative.   Allergic/Immunologic: Negative.   Neurological: Negative.   Hematological: Negative.   Psychiatric/Behavioral: Negative.     Patient Active Problem List    Diagnosis Date Noted     Anxiety 04/05/2017     Concussion, with loss of consciousness of 30 " minutes or less, subsequent encounter 04/05/2017     Elevated BP 04/05/2017     Bipolar affective disorder in remission 03/09/2017     Multiple fractures of ribs of left side 01/06/2015     Clavicle fracture 01/06/2015     Chronic Obstructive Pulmonary Disease      Asthma      Foot Pain (Soft Tissue)      Chronic Rhinitis      Headache      Obstructive Sleep Apnea      Allergies   Allergen Reactions     Amoxicillin Hives     Erythromycin      Current Outpatient Prescriptions   Medication Sig Dispense Refill     albuterol (PROAIR HFA;PROVENTIL HFA;VENTOLIN HFA) 90 mcg/actuation inhaler Inhale 2 puffs every 6 (six) hours as needed for wheezing. 18 g 0     albuterol (PROVENTIL HFA;VENTOLIN HFA) 90 mcg/actuation inhaler Inhale 1-2 puffs every 4 (four) hours as needed. 1 Inhaler 5     budesonide-formoterol (SYMBICORT) 160-4.5 mcg/actuation inhaler TAKE 2 PUFFS BY MOUTH TWICE DAILY, THEN RINSE MOUTH AFTERWARDS 1 Inhaler 5     ipratropium-albuterol (COMBIVENT RESPIMAT)  mcg/actuation Mist inhaler Inhale 1 puff 4 (four) times a day. 4 g 12     ipratropium-albuterol (DUO-NEB) 0.5-2.5 mg/3 mL nebulizer NEBULIZE 1 VIAL FOUR TIMES DAILY 180 mL 5     lisinopril (PRINIVIL,ZESTRIL) 10 MG tablet Take 1 tablet (10 mg total) by mouth daily. 30 tablet 5     OLANZapine (ZYPREXA) 7.5 MG tablet Take 1 tablet (7.5 mg total) by mouth at bedtime. 30 tablet 1     predniSONE (DELTASONE) 10 mg tablet Take 4 tab qday for 3 d, 3 tab qday for 3 d, 2 tab qday for 3d, 1 tab qday for 3d, then stop 30 tablet 0     levoFLOXacin (LEVAQUIN) 500 MG tablet Take 1 tablet (500 mg total) by mouth daily for 10 days. 10 tablet 0     No current facility-administered medications for this visit.      Past Medical History:   Diagnosis Date     Asthma      Cataract      COPD (chronic obstructive pulmonary disease)      NNEKA (obstructive sleep apnea)      Sleep apnea, obstructive      Tobacco use      Past Surgical History:   Procedure Laterality Date      CATARACT EXTRACTION       CLAVICLE SURGERY      fixation for fracture     Social History     Social History     Marital status:      Spouse name: N/A     Number of children: N/A     Years of education: N/A     Social History Main Topics     Smoking status: Current Every Day Smoker     Packs/day: 1.00     Smokeless tobacco: Never Used     Alcohol use Yes     Drug use: No     Sexual activity: Not Asked     Other Topics Concern     None     Social History Narrative     Family History   Problem Relation Age of Onset     Diabetes Mother      Stroke Mother      Heart disease Father      Sleep apnea Father      Seizures Brother      Stroke Brother          OBJECTIVE:    Vitals:    12/08/17 1529   BP: 134/82   Pulse: 97   Resp: 16   Temp: 98.2  F (36.8  C)   TempSrc: Oral   SpO2: 96%   Weight: 157 lb (71.2 kg)     Body mass index is 25.34 kg/(m^2).    Physical Exam:  Constitutional: Patient is oriented to person, place, and time. Patient appears well-developed and well-nourished. No distress.   Head: Normocephalic and atraumatic.   Right Ear: External ear normal. Slight erythema of TM  Left Ear: External ear normal. Slight erythema of TM  Nose: Nose normal.   Mouth/Throat: Oropharynx is erythematous. No oropharyngeal exudate.   Eyes: Conjunctivae and EOM are normal. Pupils are equal, round, and reactive to light. Right eye exhibits no discharge. Left eye exhibits no discharge. No scleral icterus.   Neck: Neck supple. No JVD present. No tracheal deviation present. No thyromegaly present.   Lymphadenopathy:  Patient has no cervical adenopathy.   Cardiovascular: Normal rate, regular rhythm, normal heart sounds and intact distal pulses. No murmur heard.   Pulmonary/Chest: Effort normal and breath sounds normal. No stridor. No respiratory distress. Coarse breath sounds   Skin: Skin is warm and dry. No rash noted. Patient is not diaphoretic. No erythema. No pallor.       Results for orders placed or performed in visit on  12/08/17   Influenza A/B Rapid Test   Result Value Ref Range    Influenza  A, Rapid Antigen No Influenza A antigen detected No Influenza A antigen detected    Influenza B, Rapid Antigen No Influenza B antigen detected No Influenza B antigen detected

## 2021-06-14 NOTE — TELEPHONE ENCOUNTER
Medication: Albuterol neb solution  Last Date Filled: 09/08/2020  Last appointment addressing medication: 7/9/2019  Last B/P:  BP Readings from Last 3 Encounters:   11/24/20 (!) 170/100   07/09/19 (!) 142/104   11/26/18 (!) 188/100     Last labs pertaining to refill:n/a      Pend medication and associate diagnosis before routing to Provider for review.       If patient has not been seen in over 1 year, pend 30 day supply and notify patient they are due for an appointment before any further refills.

## 2021-06-15 PROBLEM — F31.70 BIPOLAR AFFECTIVE DISORDER IN REMISSION (H): Status: ACTIVE | Noted: 2017-03-09

## 2021-06-15 PROBLEM — I10 ESSENTIAL HYPERTENSION: Chronic | Status: ACTIVE | Noted: 2017-04-05

## 2021-06-15 PROBLEM — S06.0X1D CONCUSSION WITH LOSS OF CONSCIOUSNESS OF 30 MINUTES OR LESS, SUBSEQUENT ENCOUNTER: Status: ACTIVE | Noted: 2017-04-05

## 2021-06-15 PROBLEM — F41.9 ANXIETY: Status: ACTIVE | Noted: 2017-04-05

## 2021-06-17 NOTE — PROGRESS NOTES
"S:  Very pleasant 55-year-old gentleman presents today with difficulty breathing due to wheezing.  He notes that he has seasonal allergies and that his asthma is \"acting up\".  He continues to smoke cigarettes and he works as a .  ROS: Negative for fever.  Negative for green sputum.  Positive for occasional phlegm.    Medications: Albuterol nebulizer, Symbicort, lisinopril    O:   Blood pressure 142/100, pulse 108, respiration 16, weight 167  NAD  Lungs-wheezes bilaterally  CV-RRR  Skin-Pink and dry  Psych orient x3 with good memory insight and judgment    A:   Asthma  Hypertension  Smoking    P:   Patient most likely has a COPD component as well as plain asthma.  Recommended continuing Symbicort, refilled albuterol nebulizer and started prednisone taper with 40 mg daily and decreasing every 3 days.  Patient will return in approximately 12 days after steroids are done.    Patient has not been taking his blood pressure medications as he ran out.  Refilled lisinopril encourage him to come in in the next several weeks for recheck of blood pressure.    Strongly encourage smoke cessation.  "

## 2021-06-21 NOTE — PROGRESS NOTES
Assessment:         1. Asthma  albuterol (PROVENTIL) 2.5 mg /3 mL (0.083 %) nebulizer solution   2. Essential hypertension     3. Chronic obstructive pulmonary disease, unspecified COPD type (H)     4. Bipolar affective disorder in remission (H)     5. Chronic rhinitis           Plan:        We reviewed the treatment options for his asthma/COPD symptoms and we will continue the albuterol nebs and albuterol MDI. We discussed the goals of therapy for asthma and COPD and he is not interested in starting a controller medication at this time, primarily due to the costs. He certainly would benefit from seeing pulmonary medicine, but declines that at this time as well. We discussed indications for urgent evaluation and follow up here. As far as his BP, I would like him to resume the lisinopril and I would like him to return to clinic for BP check in 3-4 wks. We may need to increase the lisinopril further or add another medication and we discussed the risks of untreated hypertension. We reviewed use of OTC analgesics as well as increased fluids and rest, and he will call or return to clinic with any ongoing or worsening symptoms. He will otherwise will f/u in  4-6 mos for routine med check/evaluation.       Subjective:          Onel Valdez is a 53 y.o. male with known asthma and COPD who presents with exacerbation. He has been unable to afford anything besides the albuterol as he has no insurance. Patient currently is not on home oxygen therapy.       Current symptoms include chronic dyspnea, non-productive cough, wheezing and nighttime symptoms. Symptoms have been present since several weeks ago and have been unchanged. He denies weight loss and colored sputum. Associated symptoms include fatigue. This episode appears to have been triggered by upper respiratory infection. Treatments tried for the current exacerbation: albuterol inhaler and albuterol nebulizer.        He is a smoker, and is cutting back. Down to about  "8 cigs/day.     Fasting today? No  Hypertension     Patient is also here for follow-up of elevated blood pressure. Associated signs and symptoms: dyspnea and tiredness/fatigue. Patient denies chest pain, exertional chest pressure/discomfort, irregular heart beat and lower extremity edema. The patient exercises infrequently. Weight trend: increasing steadily.      He reports that he has been off his medication for the last couple of mos. He denies headaches or dizziness.        The following portions of the patient's history were reviewed and updated as appropriate: allergies, current medications, past family history, past medical history, past social history, past surgical history and problem list.    Review of Systems  Pertinent items are noted in HPI.       Objective:        Vitals:    11/26/18 1518   BP: (!) 180/102   Pulse: 76   Weight: 181 lb 3 oz (82.2 kg)   Height: 5' 6\" (1.676 m)     GEN: Alert and oriented, NAD, well nourished  SKIN:  Normal skin turgor, no lesions/rashes   HEENT: NC/AT, moist mucous membranes, no rhinorrhea.    NECK: Normal.  No adenopathy or thyromegaly.  CV: Regular rate and rhythm, no murmurs.   LUNGS: Clear to auscultation bilaterally.    ABDOMEN: Soft, non-tender, non-distended, no masses   BACK: Normal  EXTREMITY: No edema, cyanosis  NEURO: Grossly normal.        "

## 2021-06-23 NOTE — TELEPHONE ENCOUNTER
Pt phone goes to busy signal. Unable to leave a message.     Left message to call back for: patient  Information to relay to patient:  Please relay message.

## 2021-06-23 NOTE — TELEPHONE ENCOUNTER
"Pt calling  Pt states he has walking pneumonia, \"I get it at this time every year.\"   He is requesting to have an Rx for abx sent to his pharmacy without having to come into clinic.  Pt states he knows he needs abx - does not have insurance and \"doesn't want to end up in the ER.\"  For the past week he has been \"coughing up green stuff that has been getting darker\"  Plugged sinuses  Chest feels tight.  Denies breathing difficulty, wheezing, fever    PLAN  Advised Pt it is preferred he is seen and assessed in clinic if concerns for pneumonia.  Will send information to PCP with Pt's request for abx and will Pt back at [phone], OK to leave a message on VM.  Zeinab Luu, RN, [facility] RN Triage/Med Refills    PCP - Please advise     Reason for Disposition    Caller requesting a NON-URGENT new prescription or refill and triager unable to refill per unit policy    Protocols used: MEDICATION QUESTION CALL-A-AH      "

## 2021-06-23 NOTE — TELEPHONE ENCOUNTER
Patient Returning Call  Reason for call:  Patient calling back  Information relayed to patient: below message relayed to patient,  Patient is VERY upset, does not feel well & was going to go to UC if nobody called him today with Antibiotics,  Patient reports NOBODY called him yesterday. Pt very happy med is at pharmacy he will go it an start it today.   Patient has additional questions:  No.   If YES, what are your questions/concerns: *NONE  Okay to leave a detailed message?:  Yes

## 2021-06-23 NOTE — TELEPHONE ENCOUNTER
I will send a new Rx for doxycycline, but ideally he should be seen in the office or walk-in clinic. He definitely should be seen if symptoms are not improving. He should take the abx with food to minimize risk of stomach upset and avoid high calcium foods for 1-2 hrs before and after taking it.

## 2021-07-03 NOTE — ADDENDUM NOTE
Addendum Note by Kunal Solorzano MD at 11/24/2020  3:00 PM     Author: Kunal Solorzano MD Service: -- Author Type: Physician    Filed: 11/24/2020  3:36 PM Encounter Date: 11/24/2020 Status: Signed    : Kunal Solorzano MD (Physician)    Addended by: KUNAL SOLORZANO on: 11/24/2020 03:36 PM        Modules accepted: Orders

## 2021-07-03 NOTE — ADDENDUM NOTE
Addendum Note by Nelida Ivory MD at 11/25/2018 12:57 AM     Author: Nelida Ivory MD Service: -- Author Type: Physician    Filed: 11/25/2018 12:57 AM Encounter Date: 11/23/2018 Status: Signed    : Nelida Ivory MD (Physician)    Addended by: NELIDA IVORY on: 11/25/2018 12:57 AM        Modules accepted: Orders

## 2022-05-19 ENCOUNTER — OFFICE VISIT (OUTPATIENT)
Dept: FAMILY MEDICINE | Facility: CLINIC | Age: 57
End: 2022-05-19
Payer: MEDICAID

## 2022-05-19 VITALS
HEIGHT: 68 IN | OXYGEN SATURATION: 95 % | DIASTOLIC BLOOD PRESSURE: 86 MMHG | WEIGHT: 180 LBS | HEART RATE: 97 BPM | SYSTOLIC BLOOD PRESSURE: 144 MMHG | BODY MASS INDEX: 27.28 KG/M2 | RESPIRATION RATE: 20 BRPM

## 2022-05-19 DIAGNOSIS — J45.909 ASTHMA, UNSPECIFIED ASTHMA SEVERITY, UNSPECIFIED WHETHER COMPLICATED, UNSPECIFIED WHETHER PERSISTENT: Primary | ICD-10-CM

## 2022-05-19 DIAGNOSIS — Z72.0 TOBACCO ABUSE: ICD-10-CM

## 2022-05-19 DIAGNOSIS — J44.1 CHRONIC OBSTRUCTIVE PULMONARY DISEASE WITH ACUTE EXACERBATION (H): ICD-10-CM

## 2022-05-19 DIAGNOSIS — I10 ESSENTIAL HYPERTENSION: ICD-10-CM

## 2022-05-19 LAB
ANION GAP SERPL CALCULATED.3IONS-SCNC: 12 MMOL/L (ref 5–18)
BUN SERPL-MCNC: 8 MG/DL (ref 8–22)
CALCIUM SERPL-MCNC: 9.1 MG/DL (ref 8.5–10.5)
CHLORIDE BLD-SCNC: 104 MMOL/L (ref 98–107)
CHOLEST SERPL-MCNC: 185 MG/DL
CO2 SERPL-SCNC: 24 MMOL/L (ref 22–31)
CREAT SERPL-MCNC: 0.86 MG/DL (ref 0.7–1.3)
FASTING STATUS PATIENT QL REPORTED: ABNORMAL
GFR SERPL CREATININE-BSD FRML MDRD: >90 ML/MIN/1.73M2
GLUCOSE BLD-MCNC: 99 MG/DL (ref 70–125)
HDLC SERPL-MCNC: 44 MG/DL
LDLC SERPL CALC-MCNC: ABNORMAL MG/DL
POTASSIUM BLD-SCNC: 3.6 MMOL/L (ref 3.5–5)
SODIUM SERPL-SCNC: 140 MMOL/L (ref 136–145)
TRIGL SERPL-MCNC: 468 MG/DL

## 2022-05-19 PROCEDURE — 80048 BASIC METABOLIC PNL TOTAL CA: CPT | Performed by: STUDENT IN AN ORGANIZED HEALTH CARE EDUCATION/TRAINING PROGRAM

## 2022-05-19 PROCEDURE — 99214 OFFICE O/P EST MOD 30 MIN: CPT | Mod: GC | Performed by: STUDENT IN AN ORGANIZED HEALTH CARE EDUCATION/TRAINING PROGRAM

## 2022-05-19 PROCEDURE — 36415 COLL VENOUS BLD VENIPUNCTURE: CPT | Performed by: STUDENT IN AN ORGANIZED HEALTH CARE EDUCATION/TRAINING PROGRAM

## 2022-05-19 PROCEDURE — 80061 LIPID PANEL: CPT | Performed by: STUDENT IN AN ORGANIZED HEALTH CARE EDUCATION/TRAINING PROGRAM

## 2022-05-19 RX ORDER — ALBUTEROL SULFATE 0.83 MG/ML
SOLUTION RESPIRATORY (INHALATION)
Qty: 450 ML | Refills: 5 | Status: SHIPPED | OUTPATIENT
Start: 2022-05-19 | End: 2022-08-23

## 2022-05-19 RX ORDER — ALBUTEROL SULFATE 90 UG/1
2 AEROSOL, METERED RESPIRATORY (INHALATION) EVERY 6 HOURS PRN
Qty: 18 G | Refills: 5 | Status: SHIPPED | OUTPATIENT
Start: 2022-05-19 | End: 2022-10-11

## 2022-05-19 RX ORDER — BUDESONIDE AND FORMOTEROL FUMARATE DIHYDRATE 160; 4.5 UG/1; UG/1
AEROSOL RESPIRATORY (INHALATION)
Qty: 10.2 G | Refills: 3 | Status: SHIPPED | OUTPATIENT
Start: 2022-05-19 | End: 2022-06-30

## 2022-05-19 RX ORDER — LISINOPRIL 10 MG/1
10 TABLET ORAL DAILY
Qty: 90 TABLET | Refills: 3 | Status: SHIPPED | OUTPATIENT
Start: 2022-05-19 | End: 2022-06-07

## 2022-05-19 RX ORDER — PREDNISONE 20 MG/1
TABLET ORAL
Qty: 15 TABLET | Refills: 0 | Status: SHIPPED | OUTPATIENT
Start: 2022-05-19 | End: 2022-05-23 | Stop reason: SINTOL

## 2022-05-19 ASSESSMENT — PATIENT HEALTH QUESTIONNAIRE - PHQ9: SUM OF ALL RESPONSES TO PHQ QUESTIONS 1-9: 9

## 2022-05-19 ASSESSMENT — ASTHMA QUESTIONNAIRES: ACT_TOTALSCORE: 5

## 2022-05-19 NOTE — LETTER
May 25, 2022      Onel Valdez  [address] [place] [address]        Dear ,    We are writing to inform you of your test results.    Your triglycerides were pretty high and your cholesterol was in the high normal range.  With your risk profile, we may need to consider starting a cholesterol medication.  Your metabolic panel looked normal.  Please feel free to call with any questions or concerns      Resulted Orders   Basic metabolic panel   Result Value Ref Range    Sodium 140 136 - 145 mmol/L    Potassium 3.6 3.5 - 5.0 mmol/L    Chloride 104 98 - 107 mmol/L    Carbon Dioxide (CO2) 24 22 - 31 mmol/L    Anion Gap 12 5 - 18 mmol/L    Urea Nitrogen 8 8 - 22 mg/dL    Creatinine 0.86 0.70 - 1.30 mg/dL    Calcium 9.1 8.5 - 10.5 mg/dL    Glucose 99 70 - 125 mg/dL    GFR Estimate >90 >60 mL/min/1.73m2      Comment:      Effective December 21, 2021 eGFRcr in adults is calculated using the 2021 CKD-EPI creatinine equation which includes age and gender (Devante et al., NEJM, DOI: 10.1056/SOQDom8654725)   Lipid Profile   Result Value Ref Range    Cholesterol 185 <=199 mg/dL    Triglycerides 468 (H) <=149 mg/dL    Direct Measure HDL 44 >=40 mg/dL      Comment:      HDL Cholesterol Reference Range:     0-2 years:   No reference ranges established for patients under 2 years old  at [facility] for lipid analytes.    2-8 years:  Greater than 45 mg/dL     18 years and older:   Female: Greater than or equal to 50 mg/dL   Male:   Greater than or equal to 40 mg/dL    LDL Cholesterol Calculated        Comment:      Cannot estimate LDL when triglyceride exceeds 400 mg/dL    Patient Fasting > 8hrs? Unknown        If you have any questions or concerns, please call the clinic at the number listed above.       Sincerely,      Bowen Kruse MD

## 2022-05-19 NOTE — PROGRESS NOTES
Assessment and Plan   (J45.909) Asthma, unspecified asthma severity, unspecified whether complicated, unspecified whether persistent  (primary encounter diagnosis)  Comment: SOB with minor activity, mildly productive cough, headaches, and diffuse wheezing in the context of recent likely viral illness are most consistent with acute on chronic asthma/COPD exacerbation. Continuing with his prior albuterol and symbicort and adding a 10 day course of PO prednisone will help with this acute worsening of symptoms.  Plan: Basic metabolic panel, Lipid Profile           PO prednisone 40 mg 5 days followed by 20 mg for 5 days          albuterol (PROAIR HFA/PROVENTIL         HFA/VENTOLIN         HFA) 108 (90 Base) MCG/ACT inhaler,       albuterol         (PROVENTIL) (2.5 MG/3ML) 0.083%           neb solution         budesonide-formoterol (SYMBICORT)       160-4.5         MCG/ACT Inhaler    (J44.1) Chronic obstructive pulmonary disease with acute exacerbation (H)  Comment: Likely some COPD component to his current presentation give 40+ year smoking history.  Plan: see above, prednisone and refilled medications.    (Z72.0) Tobacco abuse  Comment: Interested in quitting and has had success with nicotine patches previously. Will prescribe nicotine patches and chantix to help with cravings. Plan for a more in depth discussion on tobacco cessation at follow-up visit in 2 weeks.  Plan:    Nicotine patches   Chantix    (I10) Essential hypertension  Comment: Given that he had not been taking his lisinopril for about 1 year, it is reasonable to start him back on lisinopril and follow-up closely to assess need for dose adjustments. His elevated BP today is thus not surprising.  Plan: lisinopril (ZESTRIL) 10 MG tablet           BMP + lipid panel, as above    Follow up in 2 weeks    Options for treatment and follow-up care were reviewed with the patient and/or guardian. Onel Valdez and/or guardian engaged in the decision making  process and verbalized understanding of the options discussed and agreed with the final plan.    Pedro Lomeli, MS4    Resident/Fellow Attestation   I, Bowen Kruse MD, was present with the medical/BREE student who participated in the service and in the documentation of the note.  I have verified the history and personally performed the physical exam and medical decision making.  I agree with the assessment and plan of care as documented in the note.      Marty Kruse MD  [facility], PGY-3    Precepted patient with Dr. Emigdio Connor.       HPI:   Onel Valdez is a 56 year old male who presents to clinic today for   Chief Complaint   Patient presents with     Follow Up     F/U asthma, COPD. Concern about breathing. Pt report to have dizziness, light headaches     Medication Reconciliation     Attention. Refill per pt.      On nebulizer 1-2 hrs per day but now nebulizer isn't lasting. Having trouble getting up steps. Getting SOB with walking just half a block. Has been on the nebulizer basically every hour. Says it is often very difficult to catch his breath. Got some albuterol from a friend and is worried about running out as he says he can't afford more. Is coughing up whitish phlegm. Symptoms are worse when he comes inside after being outside. Says his eyes get irritated and his nose is always runny, which he attributes to allergies. Has been smoking since 12-13 years old and had been smoking 1/2 pack per day but is not smoking as much currently due to his asthma. He is also concerned that his headaches may be partially due to smoking fewer cigarettes. Is interested in medical options for quitting smoking. His main exercise is walking his dog. Has rib pain from coughing but denies chest tightness.    For the last couple of weeks, has been experiencing dizziness when he wakes up. He has an associated headache  as well. Says the headache affects the entire head and is pounding in nature. Ibuprofen has been helpful. He is concerned that he cannot afford his medications. Denies current N/V/D but says he became sick last Thursday and was sick until this Monday. Said he experience N/V/D, fever, chills, and muscle aches. Had a cough but no sore throat. Ibuprofen helped somewhat.    Has not been taking his lisinopril for about a year. He is concerned about his high BP causing his headache symptoms.          Review of Systems:     Complete ROS negative except as noted in HPI.         PMHX:   Active Problems List  Patient Active Problem List   Diagnosis     Asthma     Foot Pain (Soft Tissue)     Chronic Rhinitis     Headache     Obstructive Sleep Apnea     Chronic obstructive pulmonary disease with acute exacerbation (H)     Multiple fractures of ribs of left side     Clavicle fracture     Bipolar affective disorder in remission (H)     Anxiety     Concussion with loss of consciousness of 30 minutes or less, subsequent encounter     Essential hypertension     Other and unspecified alcohol dependence, unspecified drinking behavior     Tobacco use disorder     Active problem list reviewed and updated.    Current Medications  Current Outpatient Medications   Medication Sig Dispense Refill     albuterol (PROAIR HFA/PROVENTIL HFA/VENTOLIN HFA) 108 (90 Base) MCG/ACT inhaler Inhale 2 puffs into the lungs every 6 hours as needed 18 g 5     albuterol (PROVENTIL) (2.5 MG/3ML) 0.083% neb solution [ALBUTEROL (PROVENTIL) 2.5 MG /3 ML (0.083 %) NEBULIZER SOLUTION] NEBULIZE 3 ML( 2.5MG TOTAL) EVERY 4 HOURS AS NEEDED. 450 mL 5     budesonide-formoterol (SYMBICORT) 160-4.5 MCG/ACT Inhaler [BUDESONIDE-FORMOTEROL (SYMBICORT) 160-4.5 MCG/ACTUATION INHALER] TAKE 2 PUFFS BY MOUTH TWICE DAILY, THEN RINSE MOUTH AFTERWARDS 10.2 g 3     lisinopril (ZESTRIL) 10 MG tablet Take 1 tablet (10 mg) by mouth daily 90 tablet 3     predniSONE (DELTASONE) 20 MG  "tablet Take 2 tablets (40 mg) by mouth daily for 5 days, THEN 1 tablet (20 mg) daily for 5 days. 15 tablet 0     varenicline (CHANTIX JOSE M) 0.5 MG X 11 & 1 MG X 42 tablet Take 0.5 mg tab daily for 3 days, THEN 0.5 mg tab twice daily for 4 days, THEN 1 mg twice daily. 53 tablet 0     Medication list reviewed and updated.    Social History  Social History     Tobacco Use     Smoking status: Current Every Day Smoker     Packs/day: 2.00     Types: Cigarettes     Smokeless tobacco: Never Used   Vaping Use     Vaping Use: Never used   Substance Use Topics     Alcohol use: Yes     Comment: more than once a week     Drug use: No     History   Drug Use No       Family History  Family History   Problem Relation Age of Onset     Diabetes Mother      Cerebrovascular Disease Mother      Heart Disease Father      Sleep Apnea Father      Seizure Disorder Brother      Cerebrovascular Disease Brother        Allergies  Allergies   Allergen Reactions     Amoxicillin Hives     Erythromycin Unknown            Physical Exam:     Vitals:    05/19/22 1021 05/19/22 1029   BP: (!) 147/88 (!) 144/86   Pulse: 97    Resp: 20    SpO2: 95%    Weight: 81.6 kg (180 lb)    Height: 1.727 m (5' 8\")      Body mass index is 27.37 kg/m .    GENERAL APPEARANCE: alert, appears stated age, no acute distress.  HEENT: Eyes grossly normal to inspection, nares normal, MMM.  RESP: lungs coarse and with wheezing bilaterally, mildly increased respiratory effort.  CV: regular rate and rhythm, no murmurs, good capillary refill.  ABDOMEN: nondistended.  MSK: extremities normal, no gross deformities noted, no lower extremity edema.  SKIN: no suspicious lesions or rashes.  NEURO: Normal strength and tone, sensory exam grossly normal, mentation appears intact and speech normal.  PSYCH: mood and affect appropriate.  "

## 2022-05-19 NOTE — PROGRESS NOTES
Preceptor Attestation:   Patient seen, evaluated and discussed with the resident. I have verified the content of the note, which accurately reflects my assessment of the patient and the plan of care.  Supervising Physician:José Luis Connor MD  [facility]

## 2022-05-19 NOTE — COMMUNITY RESOURCES LIST (ENGLISH)
05/19/2022   [facility] - Outpatient Clinics  Hser Say  For questions about this resource list or additional care needs, please contact your primary care clinic or care manager.  Phone: [phone]   Email: N/A   Address: [address] [place] [address]   Hours: N/A        Hotlines and Helplines       Hotline - Crisis help  1  [facility] - Crisis Text Line - Crisis Text Line Distance: 1.98 miles      COVID-19 Status: Phone/Virtual   [address] [place] [address]  Language: English  Hours: Mon - Sun Open 24 Hours   Website: https://[place].gov/dhs/partners-and-providers/policies-procedures/adult-mental-health/crisis-text-line/     2  [facility] Adult Mental [facility] Urgent Care Distance: 2.07 miles      COVID-19 Status: Phone/Virtual   [address] [place] [address]  Language: English, Hmong, Zambian  Hours: Mon - Sun Open 24 Hours   Phone: (281) 703-9812 Website: https://www.[facility]./residents/health-medical/clinics-services/mental-behavorial-health/adult-mental-health-chemical-health/urgent-care-adult-mental-health          Mental Health       Individual counseling  3  [facility] Distance: 0.84 miles      COVID-19 Status: Regular Operations, COVID-19 Status: Phone/Virtual   [address] [place] [address]  Language: English, Hmong, Zambian  Hours: Mon 8:00 AM - 8:00 PM , Tue 8:00 AM - 5:00 PM , Wed - Thu 8:00 AM - 8:00 PM , Fri 8:00 AM - 5:00 PM , Sat 8:00 AM - 12:00 PM  Fees: Insurance, Self Pay, Sliding Fee   Phone: (150) 451-9932 Website: https://www.mncare.org     4  [facility] Distance: 0.96 miles      COVID-19 Status: Service Unavailable   [address] [place] [address]  Language: English  Hours: Mon - Fri 8:00 AM - 5:00 PM  Fees: Insurance, Self Pay   Phone: (616) 315-1250 Website:  https://[facility].org/locations/d-sztoxb-bwjzelli-[facility]---[place]     Mental health crisis care  5  [facility] Mental Health Urgent Care - Adult Program Distance: 2.07 miles      COVID-19 Status: Regular Operations   [address] [place] [address]  Language: English, Hmong, Thai  Hours: Mon - Fri 8:00 AM - 5:30 PM  Fees: Insurance, Self Pay, Sliding Fee   Phone: (848) 803-5247 Website: https://www.[facility]./residents/health-medical/clinics-services/mental-behavorial-health/adult-mental-health-chemical-health/urgent-care-adult-mental-health     6  [facility] Crisis Stabilization Services ([facility]) Distance: 4.96 miles      COVID-19 Status: Regular Operations   [address] [place] [address]  Language: English, [place] citizen  Hours: Mon - Sun Open 24 Hours  Fees: Insurance   Phone: (741) 279-7334 Email: info@.org Website: https://.org/services-programs/residential-services/abhilash-viet-house/     Mental health support group  7  [facility] Distance: 2.53 miles      COVID-19 Status: Phone/Virtual   [address] [facility] [address] [place] [address]  Language: English  Hours: Mon - Fri 8:00 AM - 4:30 PM  Fees: Free   Phone: (540) 675-3193 Website: https://www.va.gov/find-locations/facility/vc_0416V     8  [facility] Distance: 4.53 miles      COVID-19 Status: Phone/Virtual   [address] [facility] [address] [place] [address]  Language: English  Hours: Mon - Thu 7:00 AM - 8:00 PM , Fri 7:00 AM - 5:00 PM  Fees: Insurance, Self Pay   Phone: (460) 271-7021 Email: marivel@[employer] Website: https://www.[employer]/our-locations/[place]/[facility]/          Important Numbers & Websites       Emergency Services   911  City Services   311  Poison Control   (660) 380-5520  Suicide Prevention Lifeline   (840) 363-1306 (TALK)  Child Abuse Hotline   (880)  860-7887 (4-A-Child)  Sexual Assault Hotline   (103) 671-6214 (HOPE)  National Runaway Safeline   (517) 919-9375 (RUNAWAY)  All-Options Talkline   (645) 913-6232  Substance Abuse Referral   (309) 937-7244 (HELP)

## 2022-05-23 ENCOUNTER — OFFICE VISIT (OUTPATIENT)
Dept: FAMILY MEDICINE | Facility: CLINIC | Age: 57
End: 2022-05-23
Payer: MEDICAID

## 2022-05-23 VITALS
RESPIRATION RATE: 20 BRPM | DIASTOLIC BLOOD PRESSURE: 78 MMHG | HEART RATE: 94 BPM | OXYGEN SATURATION: 94 % | SYSTOLIC BLOOD PRESSURE: 117 MMHG

## 2022-05-23 DIAGNOSIS — F17.200 TOBACCO USE DISORDER: ICD-10-CM

## 2022-05-23 DIAGNOSIS — R06.00 DYSPNEA, UNSPECIFIED TYPE: Primary | ICD-10-CM

## 2022-05-23 DIAGNOSIS — J44.1 CHRONIC OBSTRUCTIVE PULMONARY DISEASE WITH ACUTE EXACERBATION (H): ICD-10-CM

## 2022-05-23 PROCEDURE — 99214 OFFICE O/P EST MOD 30 MIN: CPT | Mod: GC | Performed by: STUDENT IN AN ORGANIZED HEALTH CARE EDUCATION/TRAINING PROGRAM

## 2022-05-23 RX ORDER — DOXYCYCLINE 100 MG/1
100 CAPSULE ORAL 2 TIMES DAILY
Qty: 14 CAPSULE | Refills: 0 | Status: SHIPPED | OUTPATIENT
Start: 2022-05-23 | End: 2022-06-07

## 2022-05-23 RX ORDER — BUDESONIDE 1 MG/2ML
1 INHALANT ORAL EVERY 6 HOURS
Qty: 36 ML | Refills: 0 | Status: SHIPPED | OUTPATIENT
Start: 2022-05-23 | End: 2022-08-23

## 2022-05-23 ASSESSMENT — PATIENT HEALTH QUESTIONNAIRE - PHQ9
SUM OF ALL RESPONSES TO PHQ QUESTIONS 1-9: 27
5. POOR APPETITE OR OVEREATING: NEARLY EVERY DAY

## 2022-05-23 ASSESSMENT — ANXIETY QUESTIONNAIRES
GAD7 TOTAL SCORE: 20
2. NOT BEING ABLE TO STOP OR CONTROL WORRYING: NEARLY EVERY DAY
GAD7 TOTAL SCORE: 20
7. FEELING AFRAID AS IF SOMETHING AWFUL MIGHT HAPPEN: NEARLY EVERY DAY
6. BECOMING EASILY ANNOYED OR IRRITABLE: MORE THAN HALF THE DAYS
1. FEELING NERVOUS, ANXIOUS, OR ON EDGE: NEARLY EVERY DAY
IF YOU CHECKED OFF ANY PROBLEMS ON THIS QUESTIONNAIRE, HOW DIFFICULT HAVE THESE PROBLEMS MADE IT FOR YOU TO DO YOUR WORK, TAKE CARE OF THINGS AT HOME, OR GET ALONG WITH OTHER PEOPLE: SOMEWHAT DIFFICULT
3. WORRYING TOO MUCH ABOUT DIFFERENT THINGS: NEARLY EVERY DAY
5. BEING SO RESTLESS THAT IT IS HARD TO SIT STILL: NEARLY EVERY DAY

## 2022-05-23 NOTE — COMMUNITY RESOURCES LIST (ENGLISH)
05/23/2022   [facility] - Outpatient Clinics  Hser Say  For questions about this resource list or additional care needs, please contact your primary care clinic or care manager.  Phone: [phone]   Email: N/A   Address: [address] [place] [address]   Hours: N/A        Hotlines and Helplines       Hotline - Crisis help  1  [facility] Services - Crisis Text Line - Crisis Text Line Distance: 1.98 miles      COVID-19 Status: Phone/Virtual   [address] [place] [address]  Language: English  Hours: Mon - Sun Open 24 Hours   Website: https://[place].gov/dhs/partners-and-providers/policies-procedures/adult-mental-health/crisis-text-line/     2  [facility] Adult Mental Health Urgent Care Distance: 2.07 miles      COVID-19 Status: Phone/Virtual   [address] [place] [address]  Language: English, Hmong, Kosovan  Hours: Mon - Sun Open 24 Hours   Phone: (572) 382-6159 Website: https://www.[facility]./residents/health-medical/clinics-services/mental-behavorial-health/adult-mental-health-chemical-health/urgent-care-adult-mental-health          Mental Health       Individual counseling  3  [facility] Distance: 0.84 miles      COVID-19 Status: Regular Operations, COVID-19 Status: Phone/Virtual   [address] [place] [address]  Language: English, Hmong, Kosovan  Hours: Mon 8:00 AM - 8:00 PM , Tue 8:00 AM - 5:00 PM , Wed - Thu 8:00 AM - 8:00 PM , Fri 8:00 AM - 5:00 PM , Sat 8:00 AM - 12:00 PM  Fees: Insurance, Self Pay, Sliding Fee   Phone: (301) 701-9942 Website: https://www.mncare.org     4  [facility] Distance: 0.96 miles      COVID-19 Status: Service Unavailable   [address] [place] [address]  Language: English  Hours: Mon - Fri 8:00 AM - 5:00 PM  Fees: Insurance, Self Pay   Phone: (186) 530-5207 Website:  https://[facility].org/locations/c-ibikkb-gavcjwpb-[facility]---[place]     Mental health crisis care  5  [facility] Mental Health Urgent Care - Adult Program Distance: 2.07 miles      COVID-19 Status: Regular Operations   [address] [place] [address]  Language: English, Hmong, Israeli  Hours: Mon - Fri 8:00 AM - 5:30 PM  Fees: Insurance, Self Pay, Sliding Fee   Phone: (310) 162-6869 Website: https://www.[facility]./residents/health-medical/clinics-services/mental-behavorial-health/adult-mental-health-chemical-health/urgent-care-adult-mental-health     6  [facility] Crisis Stabilization Services ([facility]) Distance: 4.96 miles      COVID-19 Status: Regular Operations   [address] [place] [address]  Language: English, Russian  Hours: Mon - Sun Open 24 Hours  Fees: Insurance   Phone: (722) 293-1719 Email: info@[employer].org Website: https://[employer].org/services-programs/residential-services/[facility]ilash-[facility]iet-house/     Mental health support group  7  [facility] Distance: 2.53 miles      COVID-19 Status: Phone/Virtual   [address] [facility] [[email or web address]] [address] [place] [address]  Language: English  Hours: Mon - Fri 8:00 AM - 4:30 PM  Fees: Free   Phone: (329) 282-1695 Website: https://www.va.gov/find-locations/facility/vc_0416V     8  [facility] Distance: 4.53 miles      COVID-19 Status: Phone/Virtual   [address] [facility] [[email or web address]] [address] [place] [address]  Language: English  Hours: Mon - Thu 7:00 AM - 8:00 PM , Fri 7:00 AM - 5:00 PM  Fees: Insurance, Self Pay   Phone: (686) 508-2762 Email: marivel@[employer] Website: https://www.[employer]/our-locations/[place]/[facility]/          Important Numbers & Websites       Emergency Services   911  City Services   311  Poison Control   (524) 211-6412  Suicide Prevention Lifeline   (357) 257-6717 (TALK)  Child Abuse Hotline   (054)  241-8447 (4-A-Child)  Sexual Assault Hotline   (985) 120-8766 (HOPE)  National Runaway Safeline   (450) 683-3858 (RUNAWAY)  All-Options Talkline   (322) 897-2331  Substance Abuse Referral   (128) 925-7422 (HELP)

## 2022-05-23 NOTE — PROGRESS NOTES
Assessment & Plan     (R06.00) Dyspnea, unspecified type  (primary encounter diagnosis)  Comment: Bilateral wheezing, cough productive of whitish sputum, and SOB with mild exertion is consistent with asthma/COPD exacerbation. Seems somewhat improved from 5/18 after 5 days of prednisone. However, he is not tolerating the prednisone due to nausea, vomiting, and diarrhea. Therefore, switching to a different steroid is indicated.  Plan: start budesonide neb solution- sent to pharmacy  - Continue albuterol inhaler as needed  -follow up in 1-2 weeks to review symptoms.    (J44.1) Chronic obstructive pulmonary disease with acute exacerbation (H)  Comment: Given moderate improvement with prednisone but inability to tolerate prednisone, switching to nebulized budesonide is reasonable. Doxycycline is also indicated, as this presentation is consistent with an acute exacerbation of COPD. Will start 7 day course.  Plan: budesonide (PULMICORT) 1 MG/2ML neb solution,         doxycycline hyclate (VIBRAMYCIN) 100 MG capsule     DME order placed for new nebulizer equipment.                     Tobacco Cessation:   reports that he has been smoking cigarettes. He has been smoking about 2.00 packs per day. He has never used smokeless tobacco.  Tobacco Cessation Action Plan: Information offered: Patient not interested at this time         No follow-ups on file.    Pedro Lomeli, MS4    I was present with the medical student who participated in the service and in the documentation of this note. I have verified the history and personally performed the physical exam and medical decision making, and have verified the content of the note, which accurately reflects my assessment of the patient and the plan of care.    Jim Nava DO [facility]    Precepted with Dr. Blanca Rich MD        Sander Sykes is a 56 year old with COPD, NNEKA, HTN, Bipolar disorder in remission, tobacco use, who presents for  the following health issues    HPI   He had been prescribed 10 day course of prednisone on 5/18 for an acute exacerbation of asthma/COPD and last took it on 5/22. He stopped taking the prednisone due to vomiting 3 times yesterday after taking the medication. They had also been making him nauseous each day before that. He has also had diarrhea since starting the prednisone. He describes the diarrhea as loose dark brown stools without blood. He is still coughing up lots of whitish phlegm. He thinks his breathing has gotten 25% better since his visit on 5/18. He says he is using his inhaler very frequently and is close to running out. He would like a refill for his albuterol nebulizer vials. He endorses chills yesterday which lasted for a few hours. He says he had a severe reaction to an antibiotic once that resulted in swelling in his mouth, stopped his breathing, and required medical attention. He endorses chest wall pain with coughing and SOB with mild exertion. He is tolerating his recently restarted lisinopril without issues and his BP today is within the normal range.    Review of Systems  Constitutional, HEENT, cardiovascular, pulmonary, gi and gu systems are negative, except as otherwise noted.    Per last visit:   (J45.909) Asthma, unspecified asthma severity, unspecified whether complicated, unspecified whether persistent  (primary encounter diagnosis)  Comment: SOB with minor activity, mildly productive cough, headaches, and diffuse wheezing in the context of recent likely viral illness are most consistent with acute on chronic asthma/COPD exacerbation. Continuing with his prior albuterol and symbicort and adding a 10 day course of PO prednisone will help with this acute worsening of symptoms.  Plan: Basic metabolic panel, Lipid Profile           PO prednisone 40 mg 5 days followed by 20 mg for 5 days          albuterol (PROAIR HFA/PROVENTIL         HFA/VENTOLIN         HFA) 108 (90 Base) MCG/ACT inhaler,        albuterol         (PROVENTIL) (2.5 MG/3ML) 0.083%           neb solution         budesonide-formoterol (SYMBICORT)       160-4.5         MCG/ACT Inhaler     (J44.1) Chronic obstructive pulmonary disease with acute exacerbation (H)  Comment: Likely some COPD component to his current presentation give 40+ year smoking history.  Plan: see above, prednisone and refilled medications.     (Z72.0) Tobacco abuse  Comment: Interested in quitting and has had success with nicotine patches previously. Will prescribe nicotine patches and chantix to help with cravings. Plan for a more in depth discussion on tobacco cessation at follow-up visit in 2 weeks.  Plan:    Nicotine patches              Chantix     (I10) Essential hypertension  Comment: Given that he had not been taking his lisinopril for about 1 year, it is reasonable to start him back on lisinopril and follow-up closely to assess need for dose adjustments. His elevated BP today is thus not surprising.  Plan: lisinopril (ZESTRIL) 10 MG tablet           BMP + lipid panel, as above        Objective    /78   Pulse 94   Resp 20   SpO2 94%   There is no height or weight on file to calculate BMI.  Physical Exam   GENERAL: healthy, alert and no distress  EYES: Eyes grossly normal to inspection, PERRL and conjunctivae and sclerae normal  HENT: ear canals and TM's normal, nose and mouth without ulcers or lesions  RESP: inspiratory wheezes bilateral and prolonged expiratory phase  CV: regular rate and rhythm, normal S1 S2, no S3 or S4, no murmur, click or rub  MS: no gross musculoskeletal defects noted, no edema  NEURO: Normal strength and tone, mentation intact and speech normal  PSYCH: mentation appears normal, affect normal/bright    ----- Services Performed by a MEDICAL STUDENT in Presence of RESIDENT/FELLOW Physician-------

## 2022-05-23 NOTE — PROGRESS NOTES
DME (Durable Medical Equipment) Orders and Documentation  Orders Placed This Encounter   Procedures     Nebulizer and Supplies Order      The patient was assessed and it was determined the patient is in need of the following listed DME Supplies/Equipment. Please complete supporting documentation below to demonstrate medical necessity.      Nebulizer Documentation  The patient was seen 05/23/2022. After assessment, the patient will need to be treated with ongoing nebulizer for treatment/management of COPD.

## 2022-05-31 NOTE — PROGRESS NOTES
Preceptor Attestation:  Patient's case reviewed and discussed with the resident, Jim Nava DO, and I personally evaluated the patient. I agree with written assessment and plan of care.    Supervising Physician:  Blanca Rich MD   [facility]

## 2022-06-06 ENCOUNTER — TELEPHONE (OUTPATIENT)
Dept: EMERGENCY MEDICINE | Facility: CLINIC | Age: 57
End: 2022-06-06
Payer: MEDICAID

## 2022-06-06 NOTE — TELEPHONE ENCOUNTER
Coordinator Crisis Call Handoff    Writer received a call at the behavioral emergency [facility].  Pt's current location is [facility] and their phone number is [phone]. Pt expressed feeling that they are currently in crisis. Writer asked pt if they are having any thoughts of killing yourself or hurting others? Pt stated no, depressed. Because pt did not endorse risk of harm to self of others, the following steps were taken: pt was warm transferred to [facility] crisis team (phone: [phone]) at 11:20am am in order to get mental carol crisis support.     Whitley Watkins, Triage and Transition Services

## 2022-06-07 ENCOUNTER — OFFICE VISIT (OUTPATIENT)
Dept: FAMILY MEDICINE | Facility: CLINIC | Age: 57
End: 2022-06-07
Payer: MEDICAID

## 2022-06-07 VITALS
WEIGHT: 186 LBS | DIASTOLIC BLOOD PRESSURE: 89 MMHG | TEMPERATURE: 97.5 F | HEIGHT: 68 IN | OXYGEN SATURATION: 97 % | BODY MASS INDEX: 28.19 KG/M2 | RESPIRATION RATE: 18 BRPM | SYSTOLIC BLOOD PRESSURE: 175 MMHG | HEART RATE: 79 BPM

## 2022-06-07 DIAGNOSIS — F41.9 ANXIETY: ICD-10-CM

## 2022-06-07 DIAGNOSIS — J45.909 ASTHMA, UNSPECIFIED ASTHMA SEVERITY, UNSPECIFIED WHETHER COMPLICATED, UNSPECIFIED WHETHER PERSISTENT: Primary | ICD-10-CM

## 2022-06-07 DIAGNOSIS — I10 ESSENTIAL HYPERTENSION: ICD-10-CM

## 2022-06-07 DIAGNOSIS — G44.209 TENSION HEADACHE: ICD-10-CM

## 2022-06-07 DIAGNOSIS — H57.11 ACUTE RIGHT EYE PAIN: ICD-10-CM

## 2022-06-07 DIAGNOSIS — F31.70 BIPOLAR AFFECTIVE DISORDER IN REMISSION (H): ICD-10-CM

## 2022-06-07 DIAGNOSIS — G47.33 OBSTRUCTIVE SLEEP APNEA (ADULT) (PEDIATRIC): ICD-10-CM

## 2022-06-07 PROCEDURE — 99214 OFFICE O/P EST MOD 30 MIN: CPT | Mod: GC | Performed by: STUDENT IN AN ORGANIZED HEALTH CARE EDUCATION/TRAINING PROGRAM

## 2022-06-07 RX ORDER — ACETAMINOPHEN 325 MG/1
975 TABLET ORAL ONCE
Status: COMPLETED | OUTPATIENT
Start: 2022-06-07 | End: 2022-06-07

## 2022-06-07 RX ORDER — HYDROXYZINE HYDROCHLORIDE 25 MG/1
TABLET, FILM COATED ORAL
Qty: 90 TABLET | Refills: 1 | Status: SHIPPED | OUTPATIENT
Start: 2022-06-07 | End: 2022-06-08

## 2022-06-07 RX ORDER — LISINOPRIL 20 MG/1
20 TABLET ORAL DAILY
Qty: 30 TABLET | Refills: 1 | Status: SHIPPED | OUTPATIENT
Start: 2022-06-07 | End: 2022-06-08

## 2022-06-07 RX ORDER — HYDROXYZINE HYDROCHLORIDE 25 MG/1
TABLET, FILM COATED ORAL
COMMUNITY
Start: 2022-05-04 | End: 2022-06-07

## 2022-06-07 RX ADMIN — ACETAMINOPHEN 975 MG: 325 TABLET ORAL at 09:41

## 2022-06-07 ASSESSMENT — PATIENT HEALTH QUESTIONNAIRE - PHQ9: SUM OF ALL RESPONSES TO PHQ QUESTIONS 1-9: 14

## 2022-06-07 ASSESSMENT — ASTHMA QUESTIONNAIRES: ACT_TOTALSCORE: 6

## 2022-06-07 NOTE — PROGRESS NOTES
Faculty Supervision of Residents   I have examined this patient and the medical care has been evaluated and discussed with the resident. See resident note outlining our discussion.      Anh Castillo MD
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
## 2022-06-07 NOTE — COMMUNITY RESOURCES LIST (ENGLISH)
06/07/2022   [facility] Out[facility] Clin[facility]  Hser Say  For questions about this resource list or additional care needs, please contact your primary care clinic or care manager.  Phone: [phone]   Email: N/A   Address: [address] [place] [address]   Hours: N/A        Hotlines and Helplines       Hotline - Crisis help  1  [facility] - Crisis Text Line - Crisis Text Line Distance: 1.98 miles      COVID-19 Status: Phone/Virtual   [address] [place] [address]  Language: English  Hours: Mon - Sun Open 24 Hours   Website: https://[place].gov/dhs/partners-and-providers/policies-procedures/adult-mental-health/crisis-text-line/     2  [facility] Distance: 2.07 miles      COVID-19 Status: Phone/Virtual   [address] [place] [address]  Language: English, Hmong, Zambian  Hours: Mon - Sun Open 24 Hours   Phone: (420) 979-1068 Website: https://www.[facility]./residents/health-medical/clinics-services/mental-behavorial-health/adult-mental-health-chemical-health/urgent-care-adult-mental-health          Mental Health       Individual counseling  3  [facility] Distance: 0.84 miles      COVID-19 Status: Regular Operations, COVID-19 Status: Phone/Virtual   [address] [place] [address]  Language: English, Hmong, Zambian  Hours: Mon 8:00 AM - 8:00 PM , Tue 8:00 AM - 5:00 PM , Wed - Thu 8:00 AM - 8:00 PM , Fri 8:00 AM - 5:00 PM , Sat 8:00 AM - 12:00 PM  Fees: Insurance, Self Pay, Sliding Fee   Phone: (351) 378-3862 Website: https://www.[place]care.org     4  [facility] Distance: 0.96 miles      COVID-19 Status: Service Unavailable   [address] [place] [address]  Language: English  Hours: Mon - Fri 8:00 AM - 5:00 PM  Fees: Insurance, Self Pay   Phone: (568) 103-3723 Website:  https://[facility].org/locations/y-kredda-locrhxra-[facility]---[facility]     Mental health crisis care  5  [facility] Adult Program Distance: 2.07 miles      COVID-19 Status: Regular Operations   [address] [place] [address]  Language: English, Hmong, Beninese  Hours: Mon - Fri 8:00 AM - 5:30 PM  Fees: Insurance, Self Pay, Sliding Fee   Phone: (945) 307-1637 Website: https://www.[facility]./residents/health-medical/clinics-services/mental-behavorial-health/adult-mental-health-chemical-health/urgent-care-adult-mental-health     6  [facility] Crisis Stabilization Services ([facility]) Distance: 4.96 miles      COVID-19 Status: Regular Operations   [address] [place] [address]  Language: English, East Timorese  Hours: Mon - Sun Open 24 Hours  Fees: Insurance   Phone: (979) 196-9327 Email: info@[email or web address] Website: [email or web address]://[employer].org/services-programs/residential-services/abhilash-viet-house/     Mental health support group  7  [facility] Distance: 2.53 miles      COVID-19 Status: Phone/Virtual   [address] [facility] [address] [place] [address]  Language: English  Hours: Mon - Fri 8:00 AM - 4:30 PM  Fees: Free   Phone: (848) 522-1461 Website: https://www.[place].gov/find-locations/facility/vc_0416V     8  [facility] Distance: 4.53 miles      COVID-19 Status: Phone/Virtual   [address] [facility] [address] [place] [address]  Language: English  Hours: Mon - Thu 7:00 AM - 8:00 PM , Fri 7:00 AM - 5:00 PM  Fees: Insurance, Self Pay   Phone: (285) 388-5171 Email: marivel@[employer] Website: https://www.[employer]/our-locations/[place]/          Important Numbers & Websites       Emergency Services   911  City Services   311  Poison Control   (630) 809-1450  Suicide Prevention Lifeline   (898) 553-6193 (TALK)  Child Abuse Hotline   (952)  883-1721 (4-A-Child)  Sexual Assault Hotline   (520) 958-1108 (HOPE)  National Runaway Safeline   (379) 231-1084 (RUNAWAY)  All-Options Talkline   (117) 444-7621  Substance Abuse Referral   (372) 637-2131 (HELP)

## 2022-06-07 NOTE — NURSING NOTE
Clinic Administered Medication Documentation    Administrations This Visit     acetaminophen (TYLENOL) tablet 975 mg     Admin Date  06/07/2022 Action  Given Dose  975 mg Route  Oral Site   Administered By  Liban Hartman    Ordering Provider: Bowen Kruse MD    NDC: 46891-741-55    Lot#: [id number]    : [employer]    Patient Supplied?: No                Oral Medication Documentation    Patient was given Acetaminophen. Prior to medication administration, verified patients identity using patient s name and date of birth. Please see MAR and medication order for additional information.     Was entire amount of medication used? Yes  Expiration Date: 6/24

## 2022-06-08 ENCOUNTER — TELEPHONE (OUTPATIENT)
Dept: FAMILY MEDICINE | Facility: CLINIC | Age: 57
End: 2022-06-08

## 2022-06-08 DIAGNOSIS — I10 ESSENTIAL HYPERTENSION: ICD-10-CM

## 2022-06-08 DIAGNOSIS — F41.9 ANXIETY: ICD-10-CM

## 2022-06-08 RX ORDER — HYDROXYZINE HYDROCHLORIDE 25 MG/1
TABLET, FILM COATED ORAL
Qty: 90 TABLET | Refills: 1 | Status: CANCELLED | OUTPATIENT
Start: 2022-06-08

## 2022-06-08 RX ORDER — HYDROXYZINE HYDROCHLORIDE 25 MG/1
TABLET, FILM COATED ORAL
Qty: 90 TABLET | Refills: 1 | Status: SHIPPED | OUTPATIENT
Start: 2022-06-08 | End: 2022-09-19

## 2022-06-08 RX ORDER — LISINOPRIL 20 MG/1
20 TABLET ORAL DAILY
Qty: 30 TABLET | Refills: 1 | Status: SHIPPED | OUTPATIENT
Start: 2022-06-08 | End: 2022-08-23

## 2022-06-08 RX ORDER — LISINOPRIL 20 MG/1
20 TABLET ORAL DAILY
Qty: 30 TABLET | Refills: 1 | Status: CANCELLED | OUTPATIENT
Start: 2022-06-08

## 2022-06-08 NOTE — TELEPHONE ENCOUNTER
call center asked me to call patient as he was very angry/upset/swearing that his prescripts were not at [employer] from yesterdays appointment. MRN [id number]   I was on hold 45+  minutes and I did call him patient back. he said he was not listening to my shit, he is having a very bad day! I said, I would listen to him if he didn't swear and explained what I was doing to call [facility] , was on hold for 45 minutes+ and I would call back when I get a hold [facility]  . I did speak to Dr. Kruse and he sent in order from yesterday again today and wanted me to priyanka his file as he shouldn't be a A-hole to staff So reported to Linda Tellez and Yolande Hernandez and Yolande said report to [facility]-did

## 2022-06-08 NOTE — TELEPHONE ENCOUNTER
Called [facility] , spoke to Fred- said they received prescription yesterday and today, all 3; and they are ready for .. called patient back and told him prescriptions are ready for . Said ok, thanks

## 2022-06-15 ENCOUNTER — TELEPHONE (OUTPATIENT)
Dept: FAMILY MEDICINE | Facility: CLINIC | Age: 57
End: 2022-06-15
Payer: MEDICAID

## 2022-06-15 DIAGNOSIS — G44.209 TENSION HEADACHE: Primary | ICD-10-CM

## 2022-06-15 RX ORDER — ACETAMINOPHEN 500 MG
500-1000 TABLET ORAL EVERY 8 HOURS PRN
Qty: 30 TABLET | Refills: 0 | Status: SHIPPED | OUTPATIENT
Start: 2022-06-15 | End: 2022-06-27

## 2022-06-15 NOTE — TELEPHONE ENCOUNTER
[facility] phone call message- medication clarification/question:    Full Medication Name: Acetaminophen   Dose: regular strength    Question: Patient requesting  a prescription for tylenol be sent to pharmacy as he is not able to purchase over the counter.   Patient would also like a call back to discuss his headaches. Patient is using alcohol to cope with pain.    Pharmacy confirmed as [id number] - [place], [place] - [address]: Yes    OK to leave a message on voice mail? Yes    Primary language: English      needed? No    Call taken on Mechelle 15, 2022 at 10:50 AM by FRANCIS AMAYA CMA      Patient requires appointment to discuss headaches, preferably in person.  Dr. Francois will be new PCP so may be a good opportunity to meet him.    I will give a small prescription for Tylenol.      Marty Kruse MD  [facility], PGY-3

## 2022-06-27 DIAGNOSIS — G44.209 TENSION HEADACHE: ICD-10-CM

## 2022-06-27 NOTE — TELEPHONE ENCOUNTER
[facility] phone call message- medication clarification/question:    Full Medication Name: acetaminophen (TYLENOL) t   Dose: 500 MG table    Question: Patient called would like refills on medication listed above. Patient is on the schedule for 06/30/2022 as well but needed some more tylenol for his headaches.     Pharmacy confirmed as [id number] - [place], [place] - [address]: No; [address] [place] [address]    OK to leave a message on voice mail? Yes    Primary language: English      needed? No    Call taken on June 27, 2022 at 3:58 PM by Thien Choi

## 2022-06-28 RX ORDER — ACETAMINOPHEN 500 MG
500-1000 TABLET ORAL EVERY 8 HOURS PRN
Qty: 30 TABLET | Refills: 0 | Status: SHIPPED | OUTPATIENT
Start: 2022-06-28 | End: 2022-06-30

## 2022-06-29 ENCOUNTER — TELEPHONE (OUTPATIENT)
Dept: FAMILY MEDICINE | Facility: CLINIC | Age: 57
End: 2022-06-29

## 2022-06-30 ENCOUNTER — OFFICE VISIT (OUTPATIENT)
Dept: FAMILY MEDICINE | Facility: CLINIC | Age: 57
End: 2022-06-30
Payer: MEDICAID

## 2022-06-30 VITALS
HEART RATE: 93 BPM | TEMPERATURE: 97.5 F | HEIGHT: 67 IN | WEIGHT: 181 LBS | SYSTOLIC BLOOD PRESSURE: 163 MMHG | OXYGEN SATURATION: 96 % | DIASTOLIC BLOOD PRESSURE: 101 MMHG | BODY MASS INDEX: 28.41 KG/M2

## 2022-06-30 DIAGNOSIS — R51.9 CHRONIC DAILY HEADACHE: Primary | ICD-10-CM

## 2022-06-30 DIAGNOSIS — J45.909 ASTHMA, CURRENTLY ACTIVE: ICD-10-CM

## 2022-06-30 DIAGNOSIS — J44.1 CHRONIC OBSTRUCTIVE PULMONARY DISEASE WITH ACUTE EXACERBATION (H): ICD-10-CM

## 2022-06-30 PROCEDURE — 99214 OFFICE O/P EST MOD 30 MIN: CPT | Mod: 25

## 2022-06-30 PROCEDURE — 91305 COVID-19,PF,PFIZER (12+ YRS): CPT

## 2022-06-30 PROCEDURE — 0052A COVID-19,PF,PFIZER (12+ YRS): CPT

## 2022-06-30 RX ORDER — SUMATRIPTAN 50 MG/1
50 TABLET, FILM COATED ORAL
Qty: 12 TABLET | Refills: 2 | Status: SHIPPED | OUTPATIENT
Start: 2022-06-30 | End: 2022-09-19

## 2022-06-30 RX ORDER — ACETAMINOPHEN 500 MG
500-1000 TABLET ORAL EVERY 8 HOURS PRN
Qty: 30 TABLET | Refills: 0 | Status: SHIPPED | OUTPATIENT
Start: 2022-06-30

## 2022-06-30 RX ORDER — BUDESONIDE AND FORMOTEROL FUMARATE DIHYDRATE 160; 4.5 UG/1; UG/1
AEROSOL RESPIRATORY (INHALATION)
Qty: 10.2 G | Refills: 3 | Status: SHIPPED | OUTPATIENT
Start: 2022-06-30 | End: 2022-07-18

## 2022-06-30 NOTE — PROGRESS NOTES
Preceptor Attestation:   Patient seen, evaluated and discussed with the resident. I have verified the content of the note, which accurately reflects my assessment of the patient and the plan of care.    Supervising Physician:Alexandrea Gonzalez MD    [facility]

## 2022-06-30 NOTE — PATIENT INSTRUCTIONS
Imitrex: Take one at the onset of headache. Take another at two hours if the headache persists. Stop tylenol.    Symbicort: twice daily and as needed in addition, up to twelve times a day.    I ordered an MRI for your head.     Referrals: eyes    Zoloft and hydroxyzine is for your anxiety.    Referral for :     Ophthalmology    LOCATION/PLACE/Provider :    [facility]  DATE & TIME :    Faxed, location will call   PHONE :     [phone]  FAX :    [phone]  Appointment made by clinic staff/:    Lynette

## 2022-06-30 NOTE — PROGRESS NOTES
Assessment & Plan      Asthma, currently active  Chronic obstructive pulmonary disease with acute exacerbation (H)  Patient with ACT of 6 today. Increased frequency of Pulmicort 160-4.5, two puffs BID and up to 12 puffs a day. Continue nebs as needed. If no improvement by next appointment, consider added LAMA or high dose Pulmicort.   - budesonide-formoterol (SYMBICORT) 160-4.5 MCG/ACT Inhaler; [BUDESONIDE-FORMOTEROL (SYMBICORT) 160-4.5 MCG/ACTUATION INHALER] TAKE 2 PUFFS BY MOUTH TWICE DAILY, THEN RINSE MOUTH AFTERWARDS    Chronic daily headaches, worsening  Patient with a few-month history of progressively worsening, daily headaches that are worse in the morning and wake him up from sleep. Associated with right-eye vision changes as well as nausea. Due to some red-flag symptoms and progression of headache, reasonable to order imaging today. He has a radiology appt on 7/13. Discussed stopping daily tylenol due to concerns for medication overuse headache. Prescribed Imitrex today and counseled on use.  -MRI brain w/o contrast  -Sumatriptan 50mg tablet  -resent eye referral    Patient also wondering about bipolar medications. He states Dr. Ivory was managing his bipolar medications prior to coming to our clinic a few months ago. Appears he was on Zoloft and hydroxyzine as needed for anxiety. Unclear what/if he was on additional medications (mood stabilizer?). Stated he should bring his medications next time/pill bottles next visit so we can go through them together. I am unable to see any records of this management in the chart.    Return in about 3 weeks (around 7/21/2022) for Follow up.    Kristan Francois MD  [facility] KELSEY Sykes is a 57 year old, presenting for the following health issues:    Asthma/COPD  ACT Total Scores 5/17/2021 5/19/2022 6/7/2022   ACT TOTAL SCORE (Goal Greater than or Equal to 20) 17 5 6   In the past 12 months, how many times did you visit  "the emergency room for your asthma without being admitted to the hospital? 0 0 0   In the past 12 months, how many times were you hospitalized overnight because of your asthma? 0 0 0       Patient is not in an exacerbation today.   Patient is currently experiencing the following symptoms: increased shortness of breath upon exertion .     Patient's asthma triggers include: upper respiratory infections.     Current medications:     ICS/LABA- Symbicort 160-4.5, 2 puff(s) once daily    Pulmicort nebs    he denies  problems with taking his medications as listed above.    Patient's asthma is not well controlled (ACT score 20+).     Headaches  Patient reports new headaches that started a few months back. He describes them as a throbbing ache that are located at the top of his forehead and radiate to the base of his neck. They are the worse in the morning, they also wake him from sleep throughout the night. They are daily and worsening in nature.  Associated visual changes in the right eye and nausea. Last visit he was prescribed Tylenol for which he has been taking almost daily with some relieve. He also was referred for an eye exam; however, he was unable to set this up. No family or personal history of migraines.    Review of Systems   Constitutional, HEENT, cardiovascular, pulmonary, gi and gu systems are negative, except as otherwise noted.      Objective    BP (!) 163/101   Pulse 93   Temp 97.5  F (36.4  C) (Oral)   Ht 1.7 m (5' 6.93\")   Wt 82.1 kg (181 lb)   SpO2 96%   BMI 28.41 kg/m    Body mass index is 28.41 kg/m .  Physical Exam   GENERAL: healthy, alert and no distress  HEAD: no pain on palpation to temporal regions, no pain over cervical spinous process, negative spurlings  NECK: no adenopathy, no asymmetry, masses, or scars and thyroid normal to palpation  RESP: lungs clear to auscultation - no rales, rhonchi or wheezes  CV: regular rate and rhythm, normal S1 S2, no S3 or S4, no murmur, click or rub, no " peripheral edema and peripheral pulses strong  MS: no gross musculoskeletal defects noted, no edema  NEURO: CN II-XII intact, no nuchal rigidity, no focal weakness/deficit in sensation

## 2022-07-13 ENCOUNTER — HOSPITAL ENCOUNTER (OUTPATIENT)
Dept: MRI IMAGING | Facility: HOSPITAL | Age: 57
Discharge: HOME | End: 2022-07-13
Attending: FAMILY MEDICINE
Payer: COMMERCIAL

## 2022-07-13 ENCOUNTER — HOSPITAL ENCOUNTER (OUTPATIENT)
Dept: GENERAL RADIOLOGY | Facility: HOSPITAL | Age: 57
Discharge: HOME | End: 2022-07-13
Attending: FAMILY MEDICINE
Payer: COMMERCIAL

## 2022-07-13 DIAGNOSIS — R51.9 CHRONIC DAILY HEADACHE: ICD-10-CM

## 2022-07-13 DIAGNOSIS — Z01.89 ENCOUNTER FOR IMAGING TO SCREEN FOR EYE METAL PRIOR TO MAGNETIC RESONANCE IMAGING (MRI): ICD-10-CM

## 2022-07-13 PROCEDURE — 70551 MRI BRAIN STEM W/O DYE: CPT

## 2022-07-13 PROCEDURE — 70030 X-RAY EYE FOR FOREIGN BODY: CPT

## 2022-07-18 ENCOUNTER — OFFICE VISIT (OUTPATIENT)
Dept: FAMILY MEDICINE | Facility: CLINIC | Age: 57
End: 2022-07-18
Payer: COMMERCIAL

## 2022-07-18 VITALS
BODY MASS INDEX: 29.41 KG/M2 | HEART RATE: 105 BPM | OXYGEN SATURATION: 94 % | SYSTOLIC BLOOD PRESSURE: 136 MMHG | RESPIRATION RATE: 18 BRPM | HEIGHT: 66 IN | DIASTOLIC BLOOD PRESSURE: 86 MMHG | TEMPERATURE: 97.8 F | WEIGHT: 183 LBS

## 2022-07-18 DIAGNOSIS — J44.1 CHRONIC OBSTRUCTIVE PULMONARY DISEASE WITH ACUTE EXACERBATION (H): Primary | ICD-10-CM

## 2022-07-18 DIAGNOSIS — Z71.6 ENCOUNTER FOR SMOKING CESSATION COUNSELING: ICD-10-CM

## 2022-07-18 DIAGNOSIS — R51.9 CHRONIC DAILY HEADACHE: ICD-10-CM

## 2022-07-18 PROCEDURE — 99214 OFFICE O/P EST MOD 30 MIN: CPT | Mod: GC

## 2022-07-18 RX ORDER — BUDESONIDE AND FORMOTEROL FUMARATE DIHYDRATE 160; 4.5 UG/1; UG/1
AEROSOL RESPIRATORY (INHALATION)
Qty: 10.2 G | Refills: 3 | Status: SHIPPED | OUTPATIENT
Start: 2022-07-18 | End: 2022-08-23

## 2022-07-18 ASSESSMENT — ASTHMA QUESTIONNAIRES: ACT_TOTALSCORE: 6

## 2022-07-18 NOTE — PROGRESS NOTES
"Assessment and Plan     Diagnoses and all orders for this visit:  Chronic obstructive pulmonary disease with acute exacerbation (H)  Patient ACT is 7, last time was 6. Patient still uncontrolled with BID Symbicort and PRN Symbicort. He has run out of the medications because he has been taking it 2 puffs BID and 2 additional puffs. Given that patient is getting close to running out of medications prior to insurance approval will add on referral for MTM to assist with further management. Patient still uncontrolled so adding a LAMA to his regimen today.   -     budesonide-formoterol (SYMBICORT) 160-4.5 MCG/ACT Inhaler; [BUDESONIDE-FORMOTEROL (SYMBICORT) 160-4.5 MCG/ACTUATION INHALER] TAKE 2 PUFFS BY MOUTH TWICE DAILY, THEN RINSE MOUTH AFTERWARDS  -     Med Therapy Management Referral  -     tiotropium (SPIRIVA RESPIMAT) 2.5 MCG/ACT inhaler; Inhale 2 puffs into the lungs daily  - follow-up in two weeks for ongoing management    Encounter for smoking cessation counseling  Patient smoking 5-6 cigarettes a day. Down from 4 packs per day in the past. Was prescribed Chantix in the past but doesn't want to take this. Patient is motivated to quit in setting of breathing troubles as above. Counseled on use of patches and patch.  -     nicotine (NICODERM CQ) 7 MG/24HR 24 hr patch; Place 1 patch onto the skin every 24 hours  -     nicotine (NICORETTE) 2 MG gum; Place 1 each (2 mg) inside cheek every hour as needed for smoking cessation    Chronic daily headache  Patient wondering about MRI findings. Discussed no acute changes. Recommended slowly decreasing caffeine as he drinks 5-6 pots of coffee daily. Also discussed possible massage as he notes associated back and neck tension. He was started on Imitrex at last visit and counseled on medication overuse headaches. He states he is \"popping these like candy\". Patient may benefit from prevention medication. Will have patient follow-up with PCP for further discussion. May benefit " from headache diary which was not discussed today. NNEKA likely contributing as well.   -increase water intake   -decrease caffeine intake  -possible massage  -counseled on use of Imitrex only as needed      Options for treatment and follow-up care were reviewed with the patient and/or guardian. Onel Valdez and/or guardian engaged in the decision making process and verbalized understanding of the options discussed and agreed with the final plan.    Patricia Lira MD      Precepted today with: Camron Cobos MD         HPI:       Onel Valdez is a 57 year old  male with a significant past medical history of asthma and chronic obstructive pulmonary disease for presents for the following below:    Was seen at the end of June and told to increase Symbicort to two puffs BID which he has been doing and feels is very helpful. ACT is 7 today. If he doesn't have his inhalers then he has troubles going about his day. He states caffeine also helps his breathing.     Patient is smoking about 5 cigarettes per day. Interested in quitting smoking because of cost and issues with breathing. He was prescribed Chantix but hasn't started as he is nervous about quitting as he had an uncle who quit smoking who then got cancer. He smokes about 30 minutes from waking up.    Patient noting chronic daily headaches. He is taking Imitrex from last clinic visit. Drinking 5-6 pots of coffee daily. MRI was done at last visit showing no acute changes.            PMHX:     Patient Active Problem List   Diagnosis     Asthma     Foot Pain (Soft Tissue)     Chronic Rhinitis     Headache     Obstructive Sleep Apnea     Chronic obstructive pulmonary disease with acute exacerbation (H)     Multiple fractures of ribs of left side     Clavicle fracture     Bipolar affective disorder in remission (H)     Anxiety     Concussion with loss of consciousness of 30 minutes or less, subsequent encounter     Essential hypertension     Other and unspecified  alcohol dependence, unspecified drinking behavior     Tobacco use disorder     Asthma, currently active       Current Outpatient Medications   Medication Sig Dispense Refill     acetaminophen (TYLENOL) 500 MG tablet Take 1-2 tablets (500-1,000 mg) by mouth every 8 hours as needed for mild pain Do not take more than 3,000 mg in 24 hours. 30 tablet 0     albuterol (PROAIR HFA/PROVENTIL HFA/VENTOLIN HFA) 108 (90 Base) MCG/ACT inhaler Inhale 2 puffs into the lungs every 6 hours as needed 18 g 5     albuterol (PROVENTIL) (2.5 MG/3ML) 0.083% neb solution [ALBUTEROL (PROVENTIL) 2.5 MG /3 ML (0.083 %) NEBULIZER SOLUTION] NEBULIZE 3 ML( 2.5MG TOTAL) EVERY 4 HOURS AS NEEDED. 450 mL 5     budesonide (PULMICORT) 1 MG/2ML neb solution Take 2 mLs (1 mg) by nebulization every 6 hours 36 mL 0     budesonide-formoterol (SYMBICORT) 160-4.5 MCG/ACT Inhaler [BUDESONIDE-FORMOTEROL (SYMBICORT) 160-4.5 MCG/ACTUATION INHALER] TAKE 2 PUFFS BY MOUTH TWICE DAILY, THEN RINSE MOUTH AFTERWARDS 10.2 g 3     hydrOXYzine (ATARAX) 25 MG tablet TAKE 1-2 TABLETS BY MOUTH 3 TIMES DAILY AS NEEDED FOR ANXIETY 90 tablet 1     lisinopril (ZESTRIL) 20 MG tablet Take 1 tablet (20 mg) by mouth daily 30 tablet 1     nicotine (NICODERM CQ) 7 MG/24HR 24 hr patch Place 1 patch onto the skin every 24 hours 14 patch 3     nicotine (NICORETTE) 2 MG gum Place 1 each (2 mg) inside cheek every hour as needed for smoking cessation 240 each 3     sertraline (ZOLOFT) 50 MG tablet TAKE 1 TABLET BY MOUTH DAILY FOR 30 DAYS FOR DEPRESSION AND ANXIETY 60 tablet 1     SUMAtriptan (IMITREX) 50 MG tablet Take 1 tablet (50 mg) by mouth at onset of headache for migraine May repeat in 2 hours. Max 4 tablets/24 hours. 12 tablet 2     tiotropium (SPIRIVA RESPIMAT) 2.5 MCG/ACT inhaler Inhale 2 puffs into the lungs daily 4 g 0     varenicline (CHANTIX JOSE M) 0.5 MG X 11 & 1 MG X 42 tablet Take 0.5 mg tab daily for 3 days, THEN 0.5 mg tab twice daily for 4 days, THEN 1 mg twice daily. 53  "tablet 0       Social History     Tobacco Use     Smoking status: Current Every Day Smoker     Packs/day: 2.00     Types: Cigarettes     Smokeless tobacco: Never Used   Vaping Use     Vaping Use: Never used   Substance Use Topics     Alcohol use: Yes     Comment: more than once a week     Drug use: No          Allergies   Allergen Reactions     Amoxicillin Hives     Erythromycin Unknown       No results found for this or any previous visit (from the past 24 hour(s)).         Review of Systems:     10 point ROS negative except for what is noted in HPI          Physical Exam:     Vitals:    07/18/22 1433 07/18/22 1437   BP: (!) 154/90 136/86   Pulse: 105    Resp: 18    Temp: 97.8  F (36.6  C)    TempSrc: Oral    SpO2: 94%    Weight: 83 kg (183 lb)    Height: 1.676 m (5' 6\")      Body mass index is 29.54 kg/m .      GENERAL APPEARANCE: healthy, alert and no distress,  EYES: Eyes grossly normal to inspection,  PERRL   NECK: no adenopathy, no asymmetry  RESP: lungs clear to auscultation - no rales, rhonchi or wheezes  CV: regular rate and rhythm,  and no murmur, click,  rub or gallop  ABDOMEN: soft, nontender, non distended, bowel sounds present throughout  MS: extremities normal- no gross deformities noted  NEURO: Alert, mentation appears intact and speech normal  PSYCH: mood and affect normal/bright     "

## 2022-08-10 ENCOUNTER — PATIENT OUTREACH (OUTPATIENT)
Dept: FAMILY MEDICINE | Facility: CLINIC | Age: 57
End: 2022-08-10
Payer: COMMERCIAL

## 2022-08-10 ENCOUNTER — TELEPHONE (OUTPATIENT)
Dept: FAMILY MEDICINE | Facility: CLINIC | Age: 57
End: 2022-08-10

## 2022-08-10 DIAGNOSIS — R45.851 SUICIDAL IDEATION: ICD-10-CM

## 2022-08-10 DIAGNOSIS — R44.1 VISUAL HALLUCINATION: ICD-10-CM

## 2022-08-10 DIAGNOSIS — R44.0 VERBAL AUDITORY HALLUCINATION: ICD-10-CM

## 2022-08-10 DIAGNOSIS — F10.929 ALCOHOL INTOXICATION (H): Primary | ICD-10-CM

## 2022-08-10 DIAGNOSIS — R45.850 HOMICIDAL IDEATION: ICD-10-CM

## 2022-08-10 PROCEDURE — 99207 PR NO CHARGE NURSE ONLY: CPT

## 2022-08-10 NOTE — PROGRESS NOTES
"Clinic Care Coordination Contact    Follow Up Progress Note      Assessment:     SW called patient this date after patient called clinic in tears requesting to speak to someone but not clarifying nature of the problem and then hanging up.    Patient presented to appointment this date:    Appearance: N/A  Speech: Slurred speech; incoherent speech at times  Emotion: labile; depressed; angry  Perception: Patient reported visual and auditory hallucinations this date.  Thought Content: Patient vocalized suicidal ideation, intent and means with vague plan. Patient vocalized vague homicidal ideation.  Insight & Judgement: impaired.   Cognition: Patient reported impairment in memory; patient oriented x2.    SW called patient after patient disconnected call with clinic this date. Patient answered phone in tears, with slurred and at time incoherent speech reporting ongoing days-long alcohol intoxication. Patient vocalized suicidal ideation with thoughts and intent as well as means to \"throw myself in front of a bus.\" Patient reported not wanting to live anymore as a result of putting therapy dog of 10 years to sleep approximately 8 days ago who was reportedly patient's last remaining support. Patient reported having been taken to [facility] ED for suicidal ideation and alcohol intoxication approximately a week ago-patient orientation to time was mixed. Patient reported completing detox and being discharged home.     Patient reported not taking any of patient's medications at this time for undisclosed amount of time but reported having psychiatry appointment 8/12/22. Patient reported having therapists but did not clarify further. Patient reported  to meet with patient to  dog's remains 8/12/22.     Patient reported not having eaten food since detox discharge.    GEMA coordinated with clinic staff to have 911 call for welfare check for patient and for follow-up when no PD or EMS had arrived at patient's home after " 30 minutes. PD and EMS arrived after approximately 45 minutes. SW briefed PD and EMS staff on patient symptoms and recommendation patient be evaluated at [facility] ED, and patient was getting into ambulance when SW ended phone call this date.    Care Gaps:    Health Maintenance Due   Topic Date Due     PREVENTIVE CARE VISIT  Never done     SPIROMETRY  Never done     ADVANCE CARE PLANNING  Never done     COPD ACTION PLAN  Never done     COLORECTAL CANCER SCREENING  Never done     HIV SCREENING  Never done     HEPATITIS C SCREENING  Never done     Pneumococcal Vaccine: Pediatrics (0 to 5 Years) and At-Risk Patients (6 to 64 Years) (2 - PCV) 02/19/2015     ZOSTER IMMUNIZATION (1 of 2) Never done     LUNG CANCER SCREENING  06/05/2017         Goals addressed this encounter:    Goals Addressed                    This Visit's Progress       Go with EMS to [facility] ED (pt-stated)   On track      Goal Statement: I will go with EMS this date to [facility] ED for evaluation.  Date Goal set: 8/10/22  Barriers: Patient reported visual and auditory command hallucinations and alcohol intoxications  Strengths: Patient continued to speak with SW by phone until leaving with EMS this date.  Date to Achieve By: This date.  Patient expressed understanding of goal: Yes  Action steps to achieve this goal:  1. I will continue to talk to SW until EMS arrives.  2. I will go with EMS to [facility] ED for evaluation this date.                  Intervention/Education provided during outreach: De-escalation; coordination with clinic staff to call 911; coordination with police and paramedics via phone.       Plan:   Patient to be transported by EMS to [facility] ED for evaluation.     Care Coordinator will follow up in TBD.    STAN ESCUDERO, AMA, SANDRA

## 2022-08-17 DIAGNOSIS — J44.1 CHRONIC OBSTRUCTIVE PULMONARY DISEASE WITH ACUTE EXACERBATION (H): ICD-10-CM

## 2022-08-23 ENCOUNTER — OFFICE VISIT (OUTPATIENT)
Dept: PHARMACY | Facility: CLINIC | Age: 57
End: 2022-08-23
Payer: COMMERCIAL

## 2022-08-23 ENCOUNTER — OFFICE VISIT (OUTPATIENT)
Dept: FAMILY MEDICINE | Facility: CLINIC | Age: 57
End: 2022-08-23
Payer: COMMERCIAL

## 2022-08-23 VITALS — DIASTOLIC BLOOD PRESSURE: 75 MMHG | SYSTOLIC BLOOD PRESSURE: 188 MMHG

## 2022-08-23 DIAGNOSIS — I10 ESSENTIAL HYPERTENSION: ICD-10-CM

## 2022-08-23 DIAGNOSIS — J44.9 CHRONIC OBSTRUCTIVE PULMONARY DISEASE, UNSPECIFIED COPD TYPE (H): ICD-10-CM

## 2022-08-23 DIAGNOSIS — F17.200 TOBACCO USE DISORDER: Primary | ICD-10-CM

## 2022-08-23 DIAGNOSIS — J45.909 ASTHMA, CURRENTLY ACTIVE: Primary | ICD-10-CM

## 2022-08-23 DIAGNOSIS — Z71.6 ENCOUNTER FOR SMOKING CESSATION COUNSELING: ICD-10-CM

## 2022-08-23 DIAGNOSIS — F17.200 TOBACCO USE DISORDER: ICD-10-CM

## 2022-08-23 DIAGNOSIS — J44.1 CHRONIC OBSTRUCTIVE PULMONARY DISEASE WITH ACUTE EXACERBATION (H): ICD-10-CM

## 2022-08-23 PROCEDURE — 99605 MTMS BY PHARM NP 15 MIN: CPT | Performed by: PHARMACIST

## 2022-08-23 PROCEDURE — 99607 MTMS BY PHARM ADDL 15 MIN: CPT | Performed by: PHARMACIST

## 2022-08-23 PROCEDURE — 99214 OFFICE O/P EST MOD 30 MIN: CPT | Mod: GC

## 2022-08-23 RX ORDER — ALBUTEROL SULFATE 0.83 MG/ML
2.5 SOLUTION RESPIRATORY (INHALATION) EVERY 4 HOURS PRN
Qty: 450 ML | Refills: 3 | Status: SHIPPED | OUTPATIENT
Start: 2022-08-23 | End: 2022-12-06

## 2022-08-23 RX ORDER — LISINOPRIL 20 MG/1
30 TABLET ORAL DAILY
Qty: 45 TABLET | Refills: 1 | Status: SHIPPED | OUTPATIENT
Start: 2022-08-23 | End: 2022-09-19

## 2022-08-23 RX ORDER — NICOTINE 21 MG/24HR
1 PATCH, TRANSDERMAL 24 HOURS TRANSDERMAL EVERY 24 HOURS
Qty: 30 PATCH | Refills: 1 | Status: SHIPPED | OUTPATIENT
Start: 2022-08-23 | End: 2023-02-22

## 2022-08-23 RX ORDER — BUDESONIDE AND FORMOTEROL FUMARATE DIHYDRATE 160; 4.5 UG/1; UG/1
AEROSOL RESPIRATORY (INHALATION)
Qty: 20.4 G | Refills: 11 | Status: SHIPPED | OUTPATIENT
Start: 2022-08-23 | End: 2022-12-06

## 2022-08-23 ASSESSMENT — ASTHMA QUESTIONNAIRES
QUESTION_5 LAST FOUR WEEKS HOW WOULD YOU RATE YOUR ASTHMA CONTROL: SOMEWHAT CONTROLLED
QUESTION_3 LAST FOUR WEEKS HOW OFTEN DID YOUR ASTHMA SYMPTOMS (WHEEZING, COUGHING, SHORTNESS OF BREATH, CHEST TIGHTNESS OR PAIN) WAKE YOU UP AT NIGHT OR EARLIER THAN USUAL IN THE MORNING: FOUR OR MORE NIGHTS A WEEK
QUESTION_4 LAST FOUR WEEKS HOW OFTEN HAVE YOU USED YOUR RESCUE INHALER OR NEBULIZER MEDICATION (SUCH AS ALBUTEROL): THREE OR MORE TIMES PER DAY
ACT_TOTALSCORE: 7
QUESTION_1 LAST FOUR WEEKS HOW MUCH OF THE TIME DID YOUR ASTHMA KEEP YOU FROM GETTING AS MUCH DONE AT WORK, SCHOOL OR AT HOME: ALL OF THE TIME
ACT_TOTALSCORE: 7
QUESTION_2 LAST FOUR WEEKS HOW OFTEN HAVE YOU HAD SHORTNESS OF BREATH: MORE THAN ONCE A DAY

## 2022-08-23 NOTE — PROGRESS NOTES
Preceptor Attestation:   Patient seen, evaluated and discussed with the resident. I have verified the content of the note, which accurately reflects my assessment of the patient and the plan of care.  Supervising Physician:Silvia Jensen MD  [facility]

## 2022-08-23 NOTE — PROGRESS NOTES
Assessment & Plan     Chronic obstructive pulmonary disease  Refilled inhalers today. Plans to take Symbicort, two puffs twice daily and up to four times in addition. Spiriva to be taken as two puffs daily.  - budesonide-formoterol (SYMBICORT) 160-4.5 MCG/ACT Inhaler; Inhale 2 puffs twice daily plus 1-2 puffs as needed. May use up to 12 puffs per day.  - albuterol (PROVENTIL) (2.5 MG/3ML) 0.083% neb solution; Take 1 vial (2.5 mg) by nebulization every 4 hours as needed for shortness of breath / dyspnea or wheezing    Encounter for smoking cessation counseling  Tobacco use disorder  Patient has been smoking more in the past few weeks since his dog passed away. He is still motivated to quit.  - nicotine (NICODERM CQ) 21 MG/24HR 24 hr patch; Place 1 patch onto the skin every 24 hours  - nicotine polacrilex (NICORETTE) 4 MG gum; Chew one piece every 1 hours as directed.    Essential hypertension  Blood pressure uncontrolled, 188/75. He is asymptomatic today. Will titrate his lisinopril and recheck his pressure/BMP at the next visit. Advised on low salt diet and encouraged increase in activity.  - lisinopril (ZESTRIL) 20 MG tablet; Take 1.5 tablets (30 mg) by mouth daily    Return in about 2 weeks (around 9/6/2022) for Follow up, with me.    Kristan Francois MD  [facility]      Sander Sykes is a 57 year old, presenting for the following health issues:  Prenatal Care (Follow up. Stopped taking his last meds because it made him sick to his stomach. Asthma hasnt been doing well because hes been having to use his neb more frequently )      HPI     Follow-up Asthma/COPD  Patient with an ACT score of 7. He is getting a lot of relief from his Symbicort; however, he seems to be rationing it in fear of running out. Appears he has been taking his Spiriva more than prescribed.     Smoking cessation  Patient was making progress until his dog passed away a few weeks ago. He has now started smoking  again. He does feel motivated to cut down today.    Hypertension  Patient currently taking 20 mg Lisinopril without any problems. He is taking as prescribed. He landon not check his blood pressure at home. He does not follow a low sodium diet. He is not exercising intentionally. His blood pressure is elevated today, he is asymptomatic.    Review of Systems   Constitutional, HEENT, cardiovascular, pulmonary, gi and gu systems are negative, except as otherwise noted.      Objective    BP (!) 188/75 (BP Location: Right arm)   There is no height or weight on file to calculate BMI.  Physical Exam   GENERAL: healthy, alert and no distress  NECK: no adenopathy, no asymmetry, masses, or scars   RESP: lungs clear to auscultation - no rales, rhonchi or wheezes  CV: regular rate and rhythm, normal S1 S2, no S3 or S4, no murmur, click or rub, no peripheral edema and peripheral pulses strong  ABDOMEN: soft, nontender, no hepatosplenomegaly, no masses and bowel sounds normal  MS: no gross musculoskeletal defects noted, no edema

## 2022-08-23 NOTE — PATIENT INSTRUCTIONS
"For your breathing  Work on taking the Symbicort twice each day, once in the morning and once at night.   You only need to take the Spiriva once per day. This dose should help your breathing for 24 hours.     To help cut back on cigarettes (great work!)  Start using the 21 mg patch once per day.   Start using the 4 mg gum to help manage cravings.    Here are some changes that you can make to your habits to help your blood pressure.     Follow the DASH dietary pattern. This can reduce systolic blood pressure by 11 mmgHg! To read more about this, I recommend Googling \"Dash diet\".   Decrease your dietary sodium (salt) intake. Decreasing by 25% or by 1,000 mg per day can reduce systolic blood pressure by 5 mmHg.  Increase potassium in your diet. You can do this by eating 4-5 servings of fruits and vegetables per day.   Exercise on a regular basis. This should total 150 minutes of aerobic activity per week (30 minutes, 5 days per week) can reduce systolic blood pressure by 5 mmHg.  Limit alcohol intake to one (women) or two (men) drinks per day.    "

## 2022-08-23 NOTE — Clinical Note
Good job with this patient and addressing smoking cessation! I noticed you have an abdominal exam documented- probably no need to do that during this visit and you can save yourself some time in the future. :)

## 2022-08-26 NOTE — PATIENT INSTRUCTIONS
For your breathing  Work on taking the Symbicort twice each day, once in the morning and once at night.   You only need to take the Spiriva once per day. This dose should help your breathing for 24 hours.      To help cut back on cigarettes (great work!)  Start using the 21 mg patch once per day.   Start using the 4 mg gum to help manage cravings.

## 2022-08-26 NOTE — PROGRESS NOTES
Medication Therapy Management (MTM) Encounter    ASSESSMENT:                            Medication Adherence/Access: Need follow up visit to complete medication reconciliation and assessment.     Asthma, currently active  Chronic obstructive pulmonary disease with acute exacerbation (H)  Uncontrolled per ACT today. Not taking inhalers correctly.     Tobacco use disorder  Uncontrolled, however interested in change!      PLAN:                            Patient Instructions   For your breathing  1. Work on taking the Symbicort twice each day, once in the morning and once at night.   2. You only need to take the Spiriva once per day. This dose should help your breathing for 24 hours.      To help cut back on cigarettes (great work!)  1. Start using the 21 mg patch once per day.   2. Start using the 4 mg gum to help manage cravings.      Follow-up: Return in about 2 weeks (around 9/6/2022) for Medicine bottle review.    SUBJECTIVE/OBJECTIVE:                          Onel Valdez is a 57 year old male coming in for an initial visit. He was referred to me from Dr. Francois. Today's visit is a co-visit with Dr. Francois.     Reason for visit: Asthma.    Allergies/ADRs: Reviewed in chart  Past Medical History: Reviewed in chart  Social History     Tobacco Use     Smoking status: Current Every Day Smoker     Packs/day: 2.00     Types: Cigarettes     Smokeless tobacco: Never Used   Vaping Use     Vaping Use: Never used   Substance Use Topics     Alcohol use: Yes     Comment: more than once a week     Drug use: No     ^Reviewed today    Medication Adherence/Access: Frustrated as runs out of Symbicort too soon. Is unsure of names of pill medicines, agreeable to bring in bottles next time.     Asthma, currently active  Chronic obstructive pulmonary disease with acute exacerbation (H)  Has been using Spiriva more than once daily, due to concerns for SOB. Symbicort using 2 puffs twice daily in addition to using 2 puffs as needed  "for SOB. Sometimes forgets to take scheduled doses. When runs out of Symbicort, then uses his nebulizer for relief. Also has Albuterol HFA inhaler.     ACT Total Scores 6/7/2022 7/18/2022 8/23/2022   ACT TOTAL SCORE (Goal Greater than or Equal to 20) 6 6 7   In the past 12 months, how many times did you visit the emergency room for your asthma without being admitted to the hospital? 0 0 0   In the past 12 months, how many times were you hospitalized overnight because of your asthma? 0 0 0       Tobacco use disorder  Dog passed away. Began smoking again. Up to 3 packs per day. Nicotine replacement therapy had previously been helpful in cutting back. Would like to work on cutting back again on cigarettes to help his breathing as can tell has worsened. Is unclear if taking Varenicline.       Today's Vitals:   BP Readings from Last 1 Encounters:   08/23/22 (!) 188/75     Pulse Readings from Last 1 Encounters:   07/18/22 105     Wt Readings from Last 1 Encounters:   07/18/22 183 lb (83 kg)     Ht Readings from Last 1 Encounters:   07/18/22 5' 6\" (1.676 m)     Estimated body mass index is 29.54 kg/m  as calculated from the following:    Height as of 7/18/22: 5' 6\" (1.676 m).    Weight as of 7/18/22: 183 lb (83 kg).    Temp Readings from Last 1 Encounters:   07/18/22 97.8  F (36.6  C) (Oral)       ----------------      I spent 30 minutes with this patient today. Dr. Francois was provided the recommendations above  in clinic today and Dr. Jensen is the authorizing prescriber for this visit through the pharmacist collaborative practice agreement.. A copy of the visit note was provided to the patient's provider(s).    The patient was given a summary of these recommendations. See Provider note/AVS from today.     Elvia Bazzi, Pharm.D., CDCES [facility]  Phone: [phone]    For insurance/tracking purposes, MTM Team Documentation:   Medication Therapy Recommendations  Asthma, currently " active    Current Medication: budesonide-formoterol (SYMBICORT) 160-4.5 MCG/ACT Inhaler   Rationale: Patient forgets to take - Adherence - Adherence   Recommendation: Provide Education   Status: Patient Agreed - Adherence/Education         Chronic obstructive pulmonary disease with acute exacerbation (H)    Current Medication: tiotropium (SPIRIVA RESPIMAT) 2.5 MCG/ACT inhaler   Rationale: Incorrect administration   Recommendation: Provide Education   Status: Patient Agreed - Adherence/Education         Tobacco use disorder    Current Medication: nicotine (NICODERM CQ) 7 MG/24HR 24 hr patch (Discontinued)   Rationale: Dose too low - Dosage too low - Effectiveness   Recommendation: Increase Dose - nicotine 21 MG/24HR 24 hr patch   Status: Accepted per CPA

## 2022-09-19 ENCOUNTER — OFFICE VISIT (OUTPATIENT)
Dept: FAMILY MEDICINE | Facility: CLINIC | Age: 57
End: 2022-09-19
Payer: COMMERCIAL

## 2022-09-19 ENCOUNTER — OFFICE VISIT (OUTPATIENT)
Dept: PHARMACY | Facility: CLINIC | Age: 57
End: 2022-09-19
Payer: COMMERCIAL

## 2022-09-19 VITALS
TEMPERATURE: 97.7 F | DIASTOLIC BLOOD PRESSURE: 83 MMHG | SYSTOLIC BLOOD PRESSURE: 159 MMHG | WEIGHT: 185 LBS | OXYGEN SATURATION: 94 % | HEIGHT: 68 IN | HEART RATE: 76 BPM | BODY MASS INDEX: 28.04 KG/M2

## 2022-09-19 DIAGNOSIS — F31.70 BIPOLAR AFFECTIVE DISORDER IN REMISSION (H): ICD-10-CM

## 2022-09-19 DIAGNOSIS — F31.81 BIPOLAR 2 DISORDER (H): ICD-10-CM

## 2022-09-19 DIAGNOSIS — J44.1 CHRONIC OBSTRUCTIVE PULMONARY DISEASE WITH ACUTE EXACERBATION (H): ICD-10-CM

## 2022-09-19 DIAGNOSIS — R51.9 CHRONIC DAILY HEADACHE: ICD-10-CM

## 2022-09-19 DIAGNOSIS — I10 ESSENTIAL HYPERTENSION: ICD-10-CM

## 2022-09-19 DIAGNOSIS — J45.909 ASTHMA, CURRENTLY ACTIVE: Primary | ICD-10-CM

## 2022-09-19 DIAGNOSIS — F17.200 TOBACCO USE DISORDER: ICD-10-CM

## 2022-09-19 DIAGNOSIS — I10 ESSENTIAL HYPERTENSION: Primary | ICD-10-CM

## 2022-09-19 PROCEDURE — 99606 MTMS BY PHARM EST 15 MIN: CPT | Performed by: PHARMACIST

## 2022-09-19 PROCEDURE — 99214 OFFICE O/P EST MOD 30 MIN: CPT | Mod: GC

## 2022-09-19 PROCEDURE — 99607 MTMS BY PHARM ADDL 15 MIN: CPT | Performed by: PHARMACIST

## 2022-09-19 RX ORDER — OLANZAPINE 5 MG/1
5 TABLET ORAL DAILY PRN
Qty: 20 TABLET | Refills: 0 | Status: SHIPPED | OUTPATIENT
Start: 2022-09-19 | End: 2023-03-30

## 2022-09-19 RX ORDER — RISPERIDONE 2 MG/1
2 TABLET ORAL 2 TIMES DAILY
COMMUNITY
Start: 2022-08-19 | End: 2022-09-19

## 2022-09-19 RX ORDER — OLANZAPINE 5 MG/1
5 TABLET ORAL DAILY PRN
COMMUNITY
Start: 2022-08-11 | End: 2022-09-19

## 2022-09-19 RX ORDER — LISINOPRIL 20 MG/1
40 TABLET ORAL DAILY
Qty: 60 TABLET | Refills: 3 | Status: SHIPPED | OUTPATIENT
Start: 2022-09-19 | End: 2022-09-21

## 2022-09-19 RX ORDER — SUMATRIPTAN 50 MG/1
50 TABLET, FILM COATED ORAL
Qty: 12 TABLET | Refills: 2 | Status: SHIPPED | OUTPATIENT
Start: 2022-09-19

## 2022-09-19 RX ORDER — RISPERIDONE 2 MG/1
2 TABLET ORAL 2 TIMES DAILY
Qty: 60 TABLET | Refills: 0 | Status: SHIPPED | OUTPATIENT
Start: 2022-09-19 | End: 2023-02-22

## 2022-09-19 NOTE — PROGRESS NOTES
Medication Therapy Management (MTM) Encounter    ASSESSMENT:                            Medication Adherence/Access: No issues identified    Asthma, currently active  Chronic obstructive pulmonary disease with acute exacerbation (H)  Uncontrolled, main barrier is access to preferred inhaler.    Tobacco use disorder  Uncontrolled, data supports tobacco cessation along with alcohol cessation likely to be more successful than one at a time, however not able to convince patient today.     Essential hypertension  Above goal today <140/90 mmHg, misunderstanding of instructions.     Bipolar affective disorder in remission (H)  Uncontrolled, main barrier is access.      PLAN:                            - Take both doses of Spiriva in the morning  - increase Lisinopril (blood pressure medicine) to 40 mg daily. You can take 2 tablets of 20 mg to get this dose or use the 40 mg tablets we sent today    Call to pharmacy - next Symbicort refill will be 2 inhalers, available for  Thursday 9/22. Counseling provided on use of Symbicort as needed over nebs. Patient agreeable.   Dr. Francois to refill Olanzapine and Risperidone    Follow-up: Return in about 4 weeks (around 10/17/2022) for Asthma follow up.    Medication issues to be addressed at a future visit       Statin for primary prevention    SUBJECTIVE/OBJECTIVE:                          Onel Valdez is a 57 year olod male coming in for a follow-up visit. Today's visit is a co-visit with Dr. Francois. Today's visit is a follow-up MTM visit from 8/2022.     Reason for visit: asthma/COPD follow up.    Allergies/ADRs: Reviewed in chart  Past Medical History: Reviewed in chart  Social History     Tobacco Use     Smoking status: Current Every Day Smoker     Packs/day: 2.00     Types: Cigarettes     Smokeless tobacco: Never Used   Vaping Use     Vaping Use: Never used   Substance Use Topics     Alcohol use: Yes     Comment: more than once a week     Drug use: No     ^Reviewed  "today    Medication Adherence/Access: See below.     Asthma, currently active  Chronic obstructive pulmonary disease with acute exacerbation (H)  Symbicort twice daily, worry about running out so uses neb for symptoms instead of additional Symbicort doses. Was unable to get 2 inhalers with last Symbicort refill. Needing nebulizer treatments, is unable to estimate how many per day that he uses. Reports that experiences longer relief from the Symbicort. Uses the Spiriva 1 puff twice daily.     Tobacco use disorder  Was able to use the nicotine replacement therapies (patch / gum) to quit for one week however with recent stressors / deaths in family and due to going thru alcohol recovery started smoking again.     Essential hypertension  Has been taking 1 tablet per day of Lisinopril 20 mg, not taking 1 and 1/2 tablet as previously instructed. Sounds like patient thought that tablet size was increased from previous.     The 10-year ASCVD risk score (Marc LANDAVERDE Jr., et al., 2013) is: 17.7%    Values used to calculate the score:      Age: 57 years      Sex: Male      Is Non- : No      Diabetic: No      Tobacco smoker: Yes      Systolic Blood Pressure: 159 mmHg      Is BP treated: Yes      HDL Cholesterol: 44 mg/dL      Total Cholesterol: 185 mg/dL      Bipolar affective disorder in remission (H)  Currently living in [facility] sober house and doing day treatment. While involved in these services patient reports continues to manage his medicines independently.  Reports currently has had difficulty obtaining refill of medicines from psychiatry. When review medicine information available in reconcile outside medicines tab - reports Risperidone 2 mg twice daily and Olanzapine daily as needed - both filled in July and August. Patient has been out of them for a while and doesn't clearly remember. Since stopping has started hearing voices again, he calls them the \"little people\".      Today's Vitals: " "  BP Readings from Last 1 Encounters:   09/19/22 (!) 159/83     Pulse Readings from Last 1 Encounters:   09/19/22 76     Wt Readings from Last 1 Encounters:   09/19/22 185 lb (83.9 kg)     Ht Readings from Last 1 Encounters:   09/19/22 5' 8\" (1.727 m)     Estimated body mass index is 28.13 kg/m  as calculated from the following:    Height as of an earlier encounter on 9/19/22: 5' 8\" (1.727 m).    Weight as of an earlier encounter on 9/19/22: 185 lb (83.9 kg).    Temp Readings from Last 1 Encounters:   09/19/22 97.7  F (36.5  C) (Tympanic)       ----------------      I spent 55 minutes with this patient today. Dr. Francois was provided the recommendations above  in clinic today and Dr. Cobos is the authorizing prescriber for this visit through the pharmacist collaborative practice agreement.. A copy of the visit note was provided to the patient's provider(s).    The patient was given a summary of these recommendations. See Provider note/AVS from today.     Elvia Bazzi, Pharm.D., CDCES [facility]  Phone: [phone]    For insurance/tracking purposes, [facility] Team Documentation:   Medication Therapy Recommendations  Asthma    Current Medication: budesonide-formoterol (SYMBICORT) 160-4.5 MCG/ACT Inhaler   Rationale: Medication product not available - Adherence - Adherence   Recommendation: Provide Education   Status: Patient Agreed - Adherence/Education         Chronic obstructive pulmonary disease with acute exacerbation (H)    Current Medication: tiotropium (SPIRIVA RESPIMAT) 2.5 MCG/ACT inhaler   Rationale: Incorrect administration   Recommendation: Provide Education   Status: Patient Agreed - Adherence/Education         Essential hypertension    Current Medication: lisinopril (ZESTRIL) 20 MG tablet (Discontinued)   Rationale: Dose too low - Dosage too low - Effectiveness   Recommendation: Increase Dose   Status: Accepted per Provider              "

## 2022-09-19 NOTE — PROGRESS NOTES
Assessment & Plan     Essential hypertension  Blood pressure still elevated today at 159/83. No home monitor to check pressures. Unclear if he was taking 20mg vs. 30mg. Will titrate to 40mg daily with a 2-3 week follow-up.  - lisinopril (ZESTRIL) 20 MG tablet; Take 2 tablets (40 mg) by mouth daily    Chronic daily headache  Reports more frequent headaches recently, possibly related to being off of antipsychotic medications and increased auditory hallucinations.   - SUMAtriptan (IMITREX) 50 MG tablet; Take 1 tablet (50 mg) by mouth at onset of headache for migraine May repeat in 2 hours. Max 4 tablets/24 hours.    Bipolar 2 disorder (H) w/ psychotic features  Ran out of antipsychotic medication refills over 1 week ago. Experiencing more auditory hallucinations and suicidal ideations. He did have an attempt a few months ago after the passing of his dog. He is unable to refill these medications (unclear why, appears he ran out). He is currently setting up an appointment with his primary psychiatrist. In the meantime, will give him a month supply until he can see his provider given the high risks associated with him being off of the medications.  - OLANZapine (ZYPREXA) 5 MG tablet; Take 1 tablet (5 mg) by mouth daily as needed (voices)  - risperiDONE (RISPERDAL) 2 MG tablet; Take 1 tablet (2 mg) by mouth 2 times daily    COPD  Patient was using more of his nebulizer as he was unable to obtain two Symbicort inhalers from the pharmacy. Resolved this issue today. He should be able to follow his assigned regimen now with Symbicort as his rescue.    Follow up in 2-4 weeks for medication review    Syeda Haney MS-3    I was present with the medical student who participated in the service and in the documentation of this note. I have verified the history and personally performed the physical exam and medical decision making, and have verified the content of the note, which accurately reflects my assessment of the  "patient and the plan of care.     Kristan Francois MD  [facility] Subjective Donald is a 57 year old, presenting for the following health issues:  Follow Up and Recheck Medication (Completed medication reconciliation - need review)      HPI     Smoking cessation  - Quit for for 1 week but relapsed after passing of his pet dog and uncle  - Increased alcohol consumption after losses; currently in day-time alcohol treatment program and sober house  - Not interested in smoking cessation until after grieving and alcohol treatment completion, but interested in resuming nicotine patch after that    COPD  - Insurance denied symbicort refill; using symbicort only 2x/day and albuterol nebulizer 1-2 hours/day to avoid running out of symbicort. Not using symbicort as rescue  - Reports greater relief with symbicort compared to albuterol  - Experiencing more productive cough and dyspnea; unable to leave home due to frequent nebulizer use    HTN  - Unable to check bp at home due to no home monitor    BPD  - Ran out of olanzapine/risperidone and unable to get refill from psychiatry. Has been off of medication for 1 week now  - Starting to have little voices in his head again telling him to end his life, resulting in more headaches   - Endorsed suicidal ideation and attempt a few weeks ago, for which he went to the hospital.    Alcohol use disorder  - Patient is currently living in a sober house and attends daytime treatment    Review of Systems   Review of systems otherwise normal except noted above      Objective    BP (!) 159/83 (BP Location: Right arm, Patient Position: Sitting, Cuff Size: Adult Regular)   Pulse 76   Temp 97.7  F (36.5  C) (Tympanic)   Ht 1.727 m (5' 8\")   Wt 83.9 kg (185 lb)   SpO2 94%   BMI 28.13 kg/m    Body mass index is 28.13 kg/m .  Physical Exam   GENERAL: healthy, alert and no distress  NECK: no adenopathy, no asymmetry, masses, or scars and thyroid normal to " palpation  RESP: lungs clear to auscultation - no rales, rhonchi or wheezes  CV: regular rate and rhythm, normal S1 S2, no S3 or S4, no murmur, click or rub, no peripheral edema and peripheral pulses strong  ABDOMEN: soft, nontender, no hepatosplenomegaly, no masses and bowel sounds normal  MS: no gross musculoskeletal defects noted, no edema  NEURO: Normal strength and tone, mentation intact and speech normal  PSYCH: endorses auditory hallucinations, denies SI

## 2022-09-19 NOTE — PATIENT INSTRUCTIONS
- Take both doses of Spiriva in the morning  - increase Lisinopril (blood pressure medicine) to 40 mg daily. You can take 2 tablets of 20 mg to get this dose or use the 40 mg tablets we sent today

## 2022-09-20 PROBLEM — F31.70 BIPOLAR AFFECTIVE DISORDER IN REMISSION (H): Status: RESOLVED | Noted: 2017-03-09 | Resolved: 2022-09-20

## 2022-09-21 RX ORDER — LISINOPRIL 40 MG/1
40 TABLET ORAL DAILY
Qty: 90 TABLET | Refills: 1 | Status: SHIPPED | OUTPATIENT
Start: 2022-09-21 | End: 2023-02-22 | Stop reason: DRUGHIGH

## 2022-10-11 DIAGNOSIS — Z00.129 ENCOUNTER FOR ROUTINE CHILD HEALTH EXAMINATION W/O ABNORMAL FINDINGS: Primary | ICD-10-CM

## 2022-10-11 DIAGNOSIS — J44.1 CHRONIC OBSTRUCTIVE PULMONARY DISEASE WITH ACUTE EXACERBATION (H): ICD-10-CM

## 2022-10-11 RX ORDER — ALBUTEROL SULFATE 90 UG/1
2 AEROSOL, METERED RESPIRATORY (INHALATION) EVERY 6 HOURS PRN
Qty: 18 G | Refills: 0 | Status: SHIPPED | OUTPATIENT
Start: 2022-10-11 | End: 2022-11-08

## 2022-10-19 DIAGNOSIS — J44.1 CHRONIC OBSTRUCTIVE PULMONARY DISEASE WITH ACUTE EXACERBATION (H): ICD-10-CM

## 2022-11-08 DIAGNOSIS — J44.1 CHRONIC OBSTRUCTIVE PULMONARY DISEASE WITH ACUTE EXACERBATION (H): ICD-10-CM

## 2022-11-08 RX ORDER — ALBUTEROL SULFATE 90 UG/1
2 AEROSOL, METERED RESPIRATORY (INHALATION) EVERY 6 HOURS PRN
Qty: 18 G | Refills: 0 | Status: SHIPPED | OUTPATIENT
Start: 2022-11-08 | End: 2022-12-05

## 2022-12-05 DIAGNOSIS — J44.1 CHRONIC OBSTRUCTIVE PULMONARY DISEASE WITH ACUTE EXACERBATION (H): ICD-10-CM

## 2022-12-05 RX ORDER — ALBUTEROL SULFATE 90 UG/1
2 AEROSOL, METERED RESPIRATORY (INHALATION) EVERY 6 HOURS PRN
Qty: 18 G | Refills: 0 | Status: SHIPPED | OUTPATIENT
Start: 2022-12-05 | End: 2022-12-06

## 2022-12-06 ENCOUNTER — OFFICE VISIT (OUTPATIENT)
Dept: FAMILY MEDICINE | Facility: CLINIC | Age: 57
End: 2022-12-06
Payer: COMMERCIAL

## 2022-12-06 ENCOUNTER — ANCILLARY PROCEDURE (OUTPATIENT)
Dept: GENERAL RADIOLOGY | Facility: CLINIC | Age: 57
End: 2022-12-06
Attending: FAMILY MEDICINE
Payer: COMMERCIAL

## 2022-12-06 VITALS
OXYGEN SATURATION: 96 % | HEART RATE: 88 BPM | DIASTOLIC BLOOD PRESSURE: 84 MMHG | TEMPERATURE: 97.5 F | SYSTOLIC BLOOD PRESSURE: 119 MMHG | RESPIRATION RATE: 16 BRPM

## 2022-12-06 DIAGNOSIS — J44.1 CHRONIC OBSTRUCTIVE PULMONARY DISEASE WITH ACUTE EXACERBATION (H): ICD-10-CM

## 2022-12-06 DIAGNOSIS — R05.1 ACUTE COUGH: ICD-10-CM

## 2022-12-06 DIAGNOSIS — J44.9 CHRONIC OBSTRUCTIVE PULMONARY DISEASE, UNSPECIFIED COPD TYPE (H): ICD-10-CM

## 2022-12-06 DIAGNOSIS — J11.1 INFLUENZA-LIKE ILLNESS: Primary | ICD-10-CM

## 2022-12-06 PROCEDURE — 99214 OFFICE O/P EST MOD 30 MIN: CPT | Mod: CS | Performed by: FAMILY MEDICINE

## 2022-12-06 PROCEDURE — 71046 X-RAY EXAM CHEST 2 VIEWS: CPT | Mod: TC | Performed by: RADIOLOGY

## 2022-12-06 PROCEDURE — 87637 SARSCOV2&INF A&B&RSV AMP PRB: CPT | Performed by: FAMILY MEDICINE

## 2022-12-06 RX ORDER — PREDNISONE 20 MG/1
40 TABLET ORAL DAILY
Qty: 10 TABLET | Refills: 0 | Status: SHIPPED | OUTPATIENT
Start: 2022-12-06 | End: 2023-06-28

## 2022-12-06 RX ORDER — RISPERIDONE 3 MG/1
TABLET ORAL
COMMUNITY
Start: 2022-11-10 | End: 2023-02-22 | Stop reason: DRUGHIGH

## 2022-12-06 RX ORDER — OLANZAPINE 10 MG/1
5 TABLET ORAL DAILY
COMMUNITY
Start: 2022-10-19 | End: 2023-02-22

## 2022-12-06 RX ORDER — ALBUTEROL SULFATE 0.83 MG/ML
2.5 SOLUTION RESPIRATORY (INHALATION) EVERY 4 HOURS PRN
Qty: 450 ML | Refills: 3 | Status: SHIPPED | OUTPATIENT
Start: 2022-12-06 | End: 2023-02-22

## 2022-12-06 RX ORDER — ALBUTEROL SULFATE 90 UG/1
2 AEROSOL, METERED RESPIRATORY (INHALATION) EVERY 6 HOURS PRN
Qty: 18 G | Refills: 0 | Status: SHIPPED | OUTPATIENT
Start: 2022-12-06 | End: 2023-01-04

## 2022-12-06 RX ORDER — OSELTAMIVIR PHOSPHATE 75 MG/1
75 CAPSULE ORAL 2 TIMES DAILY
Qty: 10 CAPSULE | Refills: 0 | Status: SHIPPED | OUTPATIENT
Start: 2022-12-06 | End: 2022-12-11

## 2022-12-06 RX ORDER — BUDESONIDE AND FORMOTEROL FUMARATE DIHYDRATE 160; 4.5 UG/1; UG/1
AEROSOL RESPIRATORY (INHALATION)
Qty: 20.4 G | Refills: 11 | Status: SHIPPED | OUTPATIENT
Start: 2022-12-06 | End: 2023-02-22

## 2022-12-06 ASSESSMENT — ENCOUNTER SYMPTOMS
CHILLS: 1
COUGH: 1
FEVER: 1
DIAPHORESIS: 1

## 2022-12-06 NOTE — PROGRESS NOTES
Assessment and Plan       1. Influenza-like illness  Concern for flu. Start tamiflu. Await testing for COVID as well. Discussed side effects. Recheck as needed. Seek care if not improving. No bacterial infection seen on XR.  - oseltamivir (TAMIFLU) 75 MG capsule; Take 1 capsule (75 mg) by mouth 2 times daily for 5 days  Dispense: 10 capsule; Refill: 0    2. Chronic obstructive pulmonary disease with acute exacerbation (H)  Some concern for an acute exacerbation given his increase in sputum. Start pred and refill his inhalers.  - predniSONE (DELTASONE) 20 MG tablet; Take 2 tablets (40 mg) by mouth daily  Dispense: 10 tablet; Refill: 0    3. Chronic obstructive pulmonary disease, unspecified COPD type (H)  - albuterol (PROAIR HFA/PROVENTIL HFA/VENTOLIN HFA) 108 (90 Base) MCG/ACT inhaler; Inhale 2 puffs into the lungs every 6 hours as needed for shortness of breath / dyspnea  Dispense: 18 g; Refill: 0  - albuterol (PROVENTIL) (2.5 MG/3ML) 0.083% neb solution; Take 1 vial (2.5 mg) by nebulization every 4 hours as needed for shortness of breath / dyspnea or wheezing  Dispense: 450 mL; Refill: 3  - budesonide-formoterol (SYMBICORT) 160-4.5 MCG/ACT Inhaler; Inhale 2 puffs twice daily plus 1-2 puffs as needed. May use up to 12 puffs per day.  Dispense: 20.4 g; Refill: 11  - tiotropium (SPIRIVA RESPIMAT) 2.5 MCG/ACT inhaler; Inhale 2 puffs into the lungs daily  Dispense: 4 g; Refill: 0    4. Acute cough  - Symptomatic Influenza A/B & SARS-CoV2 (COVID-19) Virus PCR Multiplex; Future  - Symptomatic Influenza A/B & SARS-CoV2 (COVID-19) Virus PCR Multiplex Nose  - XR CHEST 2 VW; Future    Options for treatment and follow-up care were reviewed with the patient and/or guardian. Onel Valdez and/or guardian engaged in the decision making process and verbalized understanding of the options discussed and agreed with the final plan. I answered all of their questions.    Eddi Vu III, MD, FAAFP  [facility]  "Faculty  12/06/22 3:35 PM           HPI:       Onel Valdez is a 57 year old  male  Patient presents with:  Migrianes   Office Visit : SOB/ Cramping/ cough and fever. Feeling cold all the time.  Did a home test yesterday and negative   Recheck Medication: Need inhaler and nebulizer     Had reasonably sudden onset of fever/chills/body aches Saturday night. Cough. No sick contacts. Felt like he couldn't get out of bed on Sunday. Now is slightly better today but still feels awful. Home COVID test was negative yesterday.    Gets \"pneumonia\" twice a year usually.    Is wearing the nicotine patch. Has cut back to 2-3 cigs per day.    Needs a refill on his inhalers.         PMHX:     Patient Active Problem List   Diagnosis     Asthma     Foot Pain (Soft Tissue)     Chronic Rhinitis     Headache     Obstructive Sleep Apnea     Chronic obstructive pulmonary disease with acute exacerbation (H)     Multiple fractures of ribs of left side     Clavicle fracture     Anxiety     Concussion with loss of consciousness of 30 minutes or less, subsequent encounter     Essential hypertension     Other and unspecified alcohol dependence, unspecified drinking behavior     Tobacco use disorder     Asthma, currently active       Current Outpatient Medications   Medication Sig Dispense Refill     acetaminophen (TYLENOL) 500 MG tablet Take 1-2 tablets (500-1,000 mg) by mouth every 8 hours as needed for mild pain Do not take more than 3,000 mg in 24 hours. 30 tablet 0     albuterol (PROAIR HFA/PROVENTIL HFA/VENTOLIN HFA) 108 (90 Base) MCG/ACT inhaler Inhale 2 puffs into the lungs every 6 hours as needed for shortness of breath / dyspnea 18 g 0     albuterol (PROVENTIL) (2.5 MG/3ML) 0.083% neb solution Take 1 vial (2.5 mg) by nebulization every 4 hours as needed for shortness of breath / dyspnea or wheezing 450 mL 3     budesonide-formoterol (SYMBICORT) 160-4.5 MCG/ACT Inhaler Inhale 2 puffs twice daily plus 1-2 puffs as needed. 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 \par \par Patient was seen last year for upper abdominal bloating and change in BM. She had EGD/colonoscopy. EGD showed gastritis, neg HP. Colonoscopy showed a sigmoid mass, adenocarcinoma on pathology. She went on to have a robotic sigmoid resection, no chemo needed. She presents today for follow up visit. \par \par Colonoscopy to be scheduled. I have discussed the indications, risks and benefits of procedure with patient. Risks include, but not limited to, bleeding, perforation, infection, and reaction to anesthesia. Alternatives to colonoscopy discussed with patient. Patient was given the opportunity to ask questions, all questions were answered. The patient agrees to proceed with colonoscopy. Patient is medically optimized for colonoscopy. \par \par MoviPrep to be used, due to straining and some constipation. Bowel prep instructions discussed at length. \par \par Labs done this month, reviewed with pt. \par \par Encouraged pt to restart Omeprazole 40mg PO QD, particularly due to high dose NSAID use daily.\par \par  I, Dr Ana Nguyen,  saw an examined the patient with PA, . I agree with the assessment and plan  to 12 puffs per day. 20.4 g 11     lisinopril (ZESTRIL) 40 MG tablet Take 1 tablet (40 mg) by mouth daily 90 tablet 1     nicotine (NICODERM CQ) 21 MG/24HR 24 hr patch Place 1 patch onto the skin every 24 hours 30 patch 1     nicotine polacrilex (NICORETTE) 4 MG gum Chew one piece every 1 hours as directed. 100 each 1     OLANZapine (ZYPREXA) 10 MG tablet        OLANZapine (ZYPREXA) 5 MG tablet Take 1 tablet (5 mg) by mouth daily as needed (voices) 20 tablet 0     risperiDONE (RISPERDAL) 2 MG tablet Take 1 tablet (2 mg) by mouth 2 times daily 60 tablet 0     risperiDONE (RISPERDAL) 3 MG tablet TAKE 1 TABLET BY MOUTH TWICE DAILY IN THE MORNING AND IN THE EVENING       SUMAtriptan (IMITREX) 50 MG tablet Take 1 tablet (50 mg) by mouth at onset of headache for migraine May repeat in 2 hours. Max 4 tablets/24 hours. 12 tablet 2     tiotropium (SPIRIVA RESPIMAT) 2.5 MCG/ACT inhaler Inhale 2 puffs into the lungs daily 4 g 0       Social History     Socioeconomic History     Marital status:      Spouse name: Not on file     Number of children: Not on file     Years of education: Not on file     Highest education level: Not on file   Occupational History     Not on file   Tobacco Use     Smoking status: Every Day     Packs/day: 2.00     Types: Cigarettes     Smokeless tobacco: Never   Vaping Use     Vaping Use: Never used   Substance and Sexual Activity     Alcohol use: Yes     Comment: more than once a week     Drug use: No     Sexual activity: Not on file   Other Topics Concern     Not on file   Social History Narrative     Not on file     Social Determinants of Health     Financial Resource Strain: Not on file   Food Insecurity: Not on file   Transportation Needs: Not on file   Physical Activity: Not on file   Stress: Not on file   Social Connections: Not on file   Intimate Partner Violence: Not on file   Housing Stability: Not on file       Allergies   Allergen Reactions     Augmentin Hives and Shortness Of  Breath     swollen lips, itchiness,swollen throat     Amoxicillin Hives     Doxycycline      Erythromycin Unknown       No results found for this or any previous visit (from the past 24 hour(s)).         Review of Systems:     Review of Systems   Constitutional: Positive for chills, diaphoresis, fever and malaise/fatigue.   Respiratory: Positive for cough.             Physical Exam:     Vitals:    12/06/22 1512   BP: 119/84   Pulse: 88   Resp: 16   Temp: 97.5  F (36.4  C)   TempSrc: Oral   SpO2: 96%     There is no height or weight on file to calculate BMI.    Physical Exam  Vitals and nursing note reviewed.   Constitutional:       General: He is not in acute distress.     Appearance: Normal appearance. He is not ill-appearing, toxic-appearing or diaphoretic.   HENT:      Right Ear: Tympanic membrane, ear canal and external ear normal. There is no impacted cerumen.      Left Ear: Tympanic membrane, ear canal and external ear normal. There is no impacted cerumen.      Mouth/Throat:      Mouth: Mucous membranes are moist.      Pharynx: No oropharyngeal exudate or posterior oropharyngeal erythema.   Eyes:      General: No scleral icterus.        Right eye: No discharge.         Left eye: No discharge.      Extraocular Movements: Extraocular movements intact.      Pupils: Pupils are equal, round, and reactive to light.   Cardiovascular:      Rate and Rhythm: Normal rate and regular rhythm.      Pulses: Normal pulses.      Heart sounds: Normal heart sounds. No murmur heard.    No friction rub. No gallop.   Pulmonary:      Effort: No prolonged expiration or respiratory distress.      Breath sounds: Transmitted upper airway sounds present. Wheezing (rare, scattered, expiratory) present.      Comments: Coarse breath sounds, worst in the RUL  Chest:      Chest wall: There is dullness to percussion (RUL).   Musculoskeletal:      Cervical back: Normal range of motion and neck supple. No rigidity or tenderness.    Lymphadenopathy:      Cervical: No cervical adenopathy.   Neurological:      Mental Status: He is alert and oriented to person, place, and time.                X-Ray Interpretation:      Study: CXR    My Read: no acute process

## 2022-12-07 LAB
FLUAV RNA SPEC QL NAA+PROBE: POSITIVE
FLUBV RNA RESP QL NAA+PROBE: NEGATIVE
RSV RNA SPEC NAA+PROBE: NEGATIVE
SARS-COV-2 RNA RESP QL NAA+PROBE: NEGATIVE

## 2023-01-02 DIAGNOSIS — J44.9 CHRONIC OBSTRUCTIVE PULMONARY DISEASE, UNSPECIFIED COPD TYPE (H): ICD-10-CM

## 2023-01-03 DIAGNOSIS — J44.9 CHRONIC OBSTRUCTIVE PULMONARY DISEASE, UNSPECIFIED COPD TYPE (H): ICD-10-CM

## 2023-01-04 RX ORDER — ALBUTEROL SULFATE 90 UG/1
2 AEROSOL, METERED RESPIRATORY (INHALATION) EVERY 6 HOURS PRN
Qty: 18 G | Refills: 0 | Status: SHIPPED | OUTPATIENT
Start: 2023-01-04 | End: 2023-04-07

## 2023-01-04 RX ORDER — TIOTROPIUM BROMIDE INHALATION SPRAY 3.12 UG/1
SPRAY, METERED RESPIRATORY (INHALATION)
Qty: 4 G | Refills: 0 | Status: SHIPPED | OUTPATIENT
Start: 2023-01-04 | End: 2023-02-22

## 2023-01-23 ENCOUNTER — PATIENT OUTREACH (OUTPATIENT)
Dept: CARE COORDINATION | Facility: CLINIC | Age: 58
End: 2023-01-23
Payer: COMMERCIAL

## 2023-01-23 NOTE — PROGRESS NOTES
Clinic Care Coordination Contact  [facility]/Voicemail       Clinical Data: Care Coordinator Outreach  Outreach attempted x 1.  Left message on patient's voicemail with call back information and requested return call.  Plan: Care Coordinator  via . Care Coordinator will try to reach patient again in 1-2 business days.

## 2023-02-07 ENCOUNTER — PATIENT OUTREACH (OUTPATIENT)
Dept: CARE COORDINATION | Facility: CLINIC | Age: 58
End: 2023-02-07
Payer: COMMERCIAL

## 2023-02-07 NOTE — PROGRESS NOTES
Clinic Care Coordination Contact  [facility]/Voicemail       Clinical Data: Care Coordinator Outreach  Outreach attempted x 2.  Left message on patient's voicemail with call back information and requested return call.  Plan: Care Coordinator  via . Care Coordinator will try to reach patient again in 1-2 business days.

## 2023-02-08 ENCOUNTER — PATIENT OUTREACH (OUTPATIENT)
Dept: CARE COORDINATION | Facility: CLINIC | Age: 58
End: 2023-02-08
Payer: COMMERCIAL

## 2023-02-08 NOTE — PROGRESS NOTES
Clinic Care Coordination Contact    Follow Up Progress Note      Assessment: The pt was recently in the ED, I called to check up on the pt and help the pt setup a ED follow up. I called the pt, but got his vm, so I left a vm for the pt to give me a call back.    Care Gaps:    Health Maintenance Due   Topic Date Due     YEARLY PREVENTIVE VISIT  Never done     SPIROMETRY  Never done     ADVANCE CARE PLANNING  Never done     COPD ACTION PLAN  Never done     HEPATITIS B IMMUNIZATION (1 of 3 - 3-dose series) Never done     COLORECTAL CANCER SCREENING  Never done     HIV SCREENING  Never done     HEPATITIS C SCREENING  Never done     Pneumococcal Vaccine: Pediatrics (0 to 5 Years) and At-Risk Patients (6 to 64 Years) (2 - PCV) 02/19/2015     ZOSTER IMMUNIZATION (1 of 2) Never done     LUNG CANCER SCREENING  06/05/2017     COVID-19 Vaccine (3 - Pfizer risk series) 07/28/2022     INFLUENZA VACCINE (1) 09/01/2022     PHQ-2 (once per calendar year)  01/01/2023           Care Plans      Intervention/Education provided during outreach:               Plan:     Care Coordinator will follow up in

## 2023-02-09 ENCOUNTER — PATIENT OUTREACH (OUTPATIENT)
Dept: CARE COORDINATION | Facility: CLINIC | Age: 58
End: 2023-02-09
Payer: COMMERCIAL

## 2023-02-09 NOTE — PROGRESS NOTES
Clinic Care Coordination Contact  [facility]ic[facility]       Clinical Data: Care Coordinator Outreach  Outreach attempted x 1.  Left message on patient's voicemail with call back information and requested return call.  Plan: Care Coordinator  via . Care Coordinator will try to reach patient again in 1-2 business days.

## 2023-02-10 ENCOUNTER — PATIENT OUTREACH (OUTPATIENT)
Dept: CARE COORDINATION | Facility: CLINIC | Age: 58
End: 2023-02-10
Payer: COMMERCIAL

## 2023-02-20 ENCOUNTER — TELEPHONE (OUTPATIENT)
Dept: FAMILY MEDICINE | Facility: CLINIC | Age: 58
End: 2023-02-20

## 2023-02-20 NOTE — TELEPHONE ENCOUNTER
Patient has not been  seen in clinic since 12/6/2022 and since then has been seen in ED and hospitalized x 5 total visits. Will require a follow up in clinic prior to providing accurate medication list to facility as requested. An appointment has been scheduled with Dr Jensen Wednesday, 2/22/2023. No appointments available for tomorrow. Encounter will be routed to Dr Jensen for review and in preparation for upcoming visit. Thank you. Bethany MONROE

## 2023-02-20 NOTE — TELEPHONE ENCOUNTER
"[facility] phone call message- general phone call:    Reason for call:     Caller requesting for medication list and wanting to verify medication. \" He came in here with a lot of medication and I don't think he is suppose to have them .\"     I request for an ILEANA. Provide our fax number. \" I will have it fax over ASAP\"    Please call Eunice back once we rcvd ILEANA and thank you.     Return call needed: Yes    OK to leave a message on voice mail? Yes    Primary language: English      needed? No    Call taken on February 20, 2023 at 1:40 PM by Melody Nguyen    "

## 2023-02-22 ENCOUNTER — OFFICE VISIT (OUTPATIENT)
Dept: FAMILY MEDICINE | Facility: CLINIC | Age: 58
End: 2023-02-22
Payer: COMMERCIAL

## 2023-02-22 VITALS
DIASTOLIC BLOOD PRESSURE: 86 MMHG | HEART RATE: 71 BPM | BODY MASS INDEX: 28.34 KG/M2 | WEIGHT: 187 LBS | OXYGEN SATURATION: 96 % | HEIGHT: 68 IN | SYSTOLIC BLOOD PRESSURE: 148 MMHG

## 2023-02-22 DIAGNOSIS — Z86.73 HISTORY OF STROKE: Primary | ICD-10-CM

## 2023-02-22 DIAGNOSIS — I10 BENIGN ESSENTIAL HYPERTENSION: ICD-10-CM

## 2023-02-22 DIAGNOSIS — F31.81 BIPOLAR 2 DISORDER (H): ICD-10-CM

## 2023-02-22 DIAGNOSIS — J44.9 CHRONIC OBSTRUCTIVE PULMONARY DISEASE, UNSPECIFIED COPD TYPE (H): ICD-10-CM

## 2023-02-22 PROCEDURE — 99495 TRANSJ CARE MGMT MOD F2F 14D: CPT | Performed by: STUDENT IN AN ORGANIZED HEALTH CARE EDUCATION/TRAINING PROGRAM

## 2023-02-22 RX ORDER — ASPIRIN 81 MG
81 TABLET,CHEWABLE ORAL DAILY
COMMUNITY
Start: 2023-01-19 | End: 2023-02-22

## 2023-02-22 RX ORDER — ATORVASTATIN CALCIUM 40 MG/1
40 TABLET, FILM COATED ORAL DAILY
COMMUNITY
End: 2023-02-22

## 2023-02-22 RX ORDER — ALBUTEROL SULFATE 0.83 MG/ML
2.5 SOLUTION RESPIRATORY (INHALATION) EVERY 4 HOURS PRN
Qty: 450 ML | Refills: 3 | Status: SHIPPED | OUTPATIENT
Start: 2023-02-22 | End: 2023-06-28

## 2023-02-22 RX ORDER — OLANZAPINE 10 MG/1
5 TABLET ORAL DAILY
Qty: 30 TABLET | Refills: 1 | Status: SHIPPED | OUTPATIENT
Start: 2023-02-22 | End: 2023-02-22

## 2023-02-22 RX ORDER — TIOTROPIUM BROMIDE INHALATION SPRAY 3.12 UG/1
2 SPRAY, METERED RESPIRATORY (INHALATION) DAILY
Qty: 4 G | Refills: 0 | Status: SHIPPED | OUTPATIENT
Start: 2023-02-22 | End: 2023-04-07

## 2023-02-22 RX ORDER — LISINOPRIL 20 MG/1
20 TABLET ORAL DAILY
Qty: 30 TABLET | Refills: 0 | Status: SHIPPED | OUTPATIENT
Start: 2023-02-22 | End: 2023-03-15

## 2023-02-22 RX ORDER — ASPIRIN 81 MG
81 TABLET,CHEWABLE ORAL DAILY
Qty: 90 TABLET | Refills: 1 | Status: SHIPPED | OUTPATIENT
Start: 2023-02-22 | End: 2023-04-07

## 2023-02-22 RX ORDER — DIVALPROEX SODIUM 500 MG/1
500 TABLET, DELAYED RELEASE ORAL 2 TIMES DAILY
COMMUNITY
Start: 2023-02-09 | End: 2023-04-07

## 2023-02-22 RX ORDER — RISPERIDONE 2 MG/1
2 TABLET ORAL 2 TIMES DAILY
Qty: 60 TABLET | Refills: 0 | Status: SHIPPED | OUTPATIENT
Start: 2023-02-22 | End: 2023-04-07

## 2023-02-22 RX ORDER — ATORVASTATIN CALCIUM 40 MG/1
40 TABLET, FILM COATED ORAL DAILY
Qty: 90 TABLET | Refills: 3 | Status: SHIPPED | OUTPATIENT
Start: 2023-02-22 | End: 2023-03-30

## 2023-02-22 RX ORDER — LISINOPRIL 20 MG/1
20 TABLET ORAL DAILY
COMMUNITY
Start: 2023-01-19 | End: 2023-02-22

## 2023-02-22 RX ORDER — BUDESONIDE AND FORMOTEROL FUMARATE DIHYDRATE 160; 4.5 UG/1; UG/1
AEROSOL RESPIRATORY (INHALATION)
Qty: 20.4 G | Refills: 11 | Status: SHIPPED | OUTPATIENT
Start: 2023-02-22 | End: 2023-04-07

## 2023-02-22 ASSESSMENT — ANXIETY QUESTIONNAIRES
IF YOU CHECKED OFF ANY PROBLEMS ON THIS QUESTIONNAIRE, HOW DIFFICULT HAVE THESE PROBLEMS MADE IT FOR YOU TO DO YOUR WORK, TAKE CARE OF THINGS AT HOME, OR GET ALONG WITH OTHER PEOPLE: SOMEWHAT DIFFICULT
3. WORRYING TOO MUCH ABOUT DIFFERENT THINGS: NEARLY EVERY DAY
6. BECOMING EASILY ANNOYED OR IRRITABLE: NEARLY EVERY DAY
2. NOT BEING ABLE TO STOP OR CONTROL WORRYING: MORE THAN HALF THE DAYS
7. FEELING AFRAID AS IF SOMETHING AWFUL MIGHT HAPPEN: MORE THAN HALF THE DAYS
1. FEELING NERVOUS, ANXIOUS, OR ON EDGE: NEARLY EVERY DAY
GAD7 TOTAL SCORE: 19
5. BEING SO RESTLESS THAT IT IS HARD TO SIT STILL: NEARLY EVERY DAY
GAD7 TOTAL SCORE: 19

## 2023-02-22 ASSESSMENT — ASTHMA QUESTIONNAIRES: ACT_TOTALSCORE: 6

## 2023-02-22 ASSESSMENT — PATIENT HEALTH QUESTIONNAIRE - PHQ9
5. POOR APPETITE OR OVEREATING: NEARLY EVERY DAY
SUM OF ALL RESPONSES TO PHQ QUESTIONS 1-9: 18

## 2023-02-22 NOTE — PATIENT INSTRUCTIONS
*Please call [phone] to make an appt with the [facility] for follow up of medication and seizures

## 2023-02-22 NOTE — PROGRESS NOTES
"       HPI:     Onel Valdez is a 57 year old  male with PMH of frontal encephalomalacia, alcohol abuse, COPD, HTN, and bipolar disorder who presents for hospital follow up.     Onel Valdez is here alone.     Was admitted from 2/8/23-2/9/23 at [place], admitted for focal seizures.   Prior to admission was having symptoms of right facial numbness and during admission EEG did not correlate but it was felt that symptoms represent focal seizures. MRI showed no new stroke. Recent MRI in January had shown \"possible small vessel disease.\"     Medication changes:  --Plavix was discontinued  --Depakote was initiated  --Nicotine replacement was initiated  --Zyprexa 5mg daily prescribed    Patient did not bring meds with today. I went through med list with him from hospital discharge summary and he confirmed for me what he had or had not been taking.     Takes depakote in the AM and at night.   He still notices lip and tongue numbness around 6 or 6:30 pm. Then takes nighttime depakote and the symptoms go away.   Is in treatment center now for alcohol treatment. Located in [facility].   Reports that treatment center only has his depakote and inhalers and lisinopril, needs me to send in the rest of his meds.     Insurance company is declining the second symbicort inhaler- insurance would only cover one per month.     He wants a nebulizer machine and mask    COPD symptoms not well controlled. He has started smoking again and is not using nicotine replacement. Is not interested in quitting at this time.          PMHX:     Patient Active Problem List   Diagnosis     Asthma     Foot Pain (Soft Tissue)     Chronic Rhinitis     Headache     Obstructive Sleep Apnea     Chronic obstructive pulmonary disease with acute exacerbation (H)     Multiple fractures of ribs of left side     Clavicle fracture     Anxiety     Concussion with loss of consciousness of 30 minutes or less, subsequent encounter     Essential hypertension     " Other and unspecified alcohol dependence, unspecified drinking behavior     Tobacco use disorder     Asthma, currently active       Current Outpatient Medications   Medication Sig Dispense Refill     acetaminophen (TYLENOL) 500 MG tablet Take 1-2 tablets (500-1,000 mg) by mouth every 8 hours as needed for mild pain Do not take more than 3,000 mg in 24 hours. 30 tablet 0     albuterol (PROAIR HFA/PROVENTIL HFA/VENTOLIN HFA) 108 (90 Base) MCG/ACT inhaler Inhale 2 puffs into the lungs every 6 hours as needed for shortness of breath 18 g 0     albuterol (PROVENTIL) (2.5 MG/3ML) 0.083% neb solution Take 1 vial (2.5 mg) by nebulization every 4 hours as needed for shortness of breath or wheezing 450 mL 3     ASPIRIN LOW DOSE 81 MG chewable tablet Take 1 tablet (81 mg) by mouth daily 90 tablet 1     atorvastatin (LIPITOR) 40 MG tablet Take 1 tablet (40 mg) by mouth daily 90 tablet 3     budesonide-formoterol (SYMBICORT) 160-4.5 MCG/ACT Inhaler Inhale 2 puffs twice daily plus 1-2 puffs as needed. May use up to 12 puffs per day. 20.4 g 11     divalproex sodium delayed-release (DEPAKOTE) 500 MG DR tablet Take 500 mg by mouth 2 times daily       lisinopril (ZESTRIL) 20 MG tablet Take 1 tablet (20 mg) by mouth daily 30 tablet 0     OLANZapine (ZYPREXA) 5 MG tablet Take 1 tablet (5 mg) by mouth daily as needed (voices) 20 tablet 0     predniSONE (DELTASONE) 20 MG tablet Take 2 tablets (40 mg) by mouth daily 10 tablet 0     risperiDONE (RISPERDAL) 2 MG tablet Take 1 tablet (2 mg) by mouth 2 times daily 60 tablet 0     SUMAtriptan (IMITREX) 50 MG tablet Take 1 tablet (50 mg) by mouth at onset of headache for migraine May repeat in 2 hours. Max 4 tablets/24 hours. 12 tablet 2     tiotropium (SPIRIVA RESPIMAT) 2.5 MCG/ACT inhaler Inhale 2 puffs into the lungs daily 4 g 0       Social History     Tobacco Use     Smoking status: Every Day     Packs/day: 0.50     Types: Cigarettes     Smokeless tobacco: Never   Vaping Use     Vaping  "Use: Never used   Substance Use Topics     Alcohol use: Yes     Comment: more than once a week     Drug use: No       Social History     Social History Narrative     Not on file       Allergies   Allergen Reactions     Augmentin Hives and Shortness Of Breath     swollen lips, itchiness,swollen throat     Amoxicillin Hives     Doxycycline      Erythromycin Unknown       No results found for this or any previous visit (from the past 24 hour(s)).         Review of Systems:   7 point ROS negative except as noted.           Physical Exam:     Vitals:    02/22/23 1011 02/22/23 1014   BP: (!) 179/98 (!) 148/86   Pulse: 71    SpO2: 96%    Weight: 84.8 kg (187 lb)    Height: 1.715 m (5' 7.5\")      Body mass index is 28.86 kg/m .    General: Alert male in no acute distress, pleasant  HEENT: PERRL, moist oral mucus membranes  Pulm: CTA BL, no tachypnea  CV: RRR, no murmur  Ext: Warm, well perfused, 2+ BL radial pulses  Skin: No rash on limited skin exam  Psych: Patient reports depressed mood and sometimes having auditory hallucinations. No suicidal ideation. Dress is casual, eye contact good, insight is good, he is not responding to external stimuli during visit.       Assessment and Plan     1. Chronic obstructive pulmonary disease, unspecified COPD type (H)  Poor control per patient but his lungs sound ok today, not working hard to breathe, no infectious symptoms. I ordered new neb machine which I think will help. We will need to do a prior auth process for symbicort since they are not filling the correct amount. I started that process today. Reordered spiriva for him as well since he is out. RTC 2 weeks. He has prednisone prescribed if needed for flare but I do not feel he is in a flare today.   - albuterol (PROVENTIL) (2.5 MG/3ML) 0.083% neb solution; Take 1 vial (2.5 mg) by nebulization every 4 hours as needed for shortness of breath or wheezing  Dispense: 450 mL; Refill: 3  - Nebulizer and Supplies Order for DME - ONLY " FOR DME  - budesonide-formoterol (SYMBICORT) 160-4.5 MCG/ACT Inhaler; Inhale 2 puffs twice daily plus 1-2 puffs as needed. May use up to 12 puffs per day.  Dispense: 20.4 g; Refill: 11  - tiotropium (SPIRIVA RESPIMAT) 2.5 MCG/ACT inhaler; Inhale 2 puffs into the lungs daily  Dispense: 4 g; Refill: 0    2. Bipolar 2 disorder (H)  Patient having significant psych symptoms due to being off meds. I reordered risperidone for him. He reports he has zyprexa at treatment center. Difficult to discern from patient what he is supposed to be taking so I based meds on his discharge summary which stated risperidone 2mg BID and zyprexa 5mg daily.   - risperiDONE (RISPERDAL) 2 MG tablet; Take 1 tablet (2 mg) by mouth 2 times daily  Dispense: 60 tablet; Refill: 0    3. History of possible stroke  Possible small vessel disease seen on recent MRI, was told to continue aspirin and stop plavix. I changed med list to reflect this. Needed refill of atorvastatin and lisinopril.   - ASPIRIN LOW DOSE 81 MG chewable tablet; Take 1 tablet (81 mg) by mouth daily  Dispense: 90 tablet; Refill: 1  - lisinopril (ZESTRIL) 20 MG tablet; Take 1 tablet (20 mg) by mouth daily  Dispense: 30 tablet; Refill: 0  - atorvastatin (LIPITOR) 40 MG tablet; Take 1 tablet (40 mg) by mouth daily  Dispense: 90 tablet; Refill: 3    4. Benign essential hypertension  BP high today, needs refill of lisinopril, recheck in 2 weeks after taking more consistently.   - lisinopril (ZESTRIL) 20 MG tablet; Take 1 tablet (20 mg) by mouth daily  Dispense: 30 tablet; Refill: 0    5. Newly diagnosed seizure disorder  He is taking depakote as prescribed but seems to be having breakthrough symptoms between depakote doses. He has not made appt yet with [facility] so I emphasized the importance of this and put phone information for their group in his AVS. Continue current dosing for now.       Med rec completed. See below for details. Rec based on patient report and discharge  summary review.   Medications Discontinued During This Encounter   Medication Reason     albuterol (PROVENTIL) (2.5 MG/3ML) 0.083% neb solution Reorder (No AVS / No eCancel)     budesonide-formoterol (SYMBICORT) 160-4.5 MCG/ACT Inhaler Reorder (No AVS / No eCancel)     SPIRIVA RESPIMAT 2.5 MCG/ACT inhaler Reorder (No AVS / No eCancel)     nicotine polacrilex (NICORETTE) 4 MG gum Stopped by Patient (No AVS)     nicotine (NICODERM CQ) 21 MG/24HR 24 hr patch Stopped by Patient (No AVS)     lisinopril (ZESTRIL) 40 MG tablet Dose adjustment     risperiDONE (RISPERDAL) 3 MG tablet Dose adjustment     risperiDONE (RISPERDAL) 2 MG tablet Reorder (No AVS / No eCancel)     OLANZapine (ZYPREXA) 10 MG tablet Reorder (No AVS / No eCancel)     ASPIRIN LOW DOSE 81 MG chewable tablet Reorder (No AVS / No eCancel)     lisinopril (ZESTRIL) 20 MG tablet Reorder (No AVS / No eCancel)     sertraline (ZOLOFT) 50 MG tablet Reorder (No AVS / No eCancel)     atorvastatin (LIPITOR) 40 MG tablet Reorder (No AVS / No eCancel)     OLANZapine (ZYPREXA) 10 MG tablet Duplicate Therapy (No eCancel)     sertraline (ZOLOFT) 50 MG tablet Med Rec(No AVS / No eCancel)     1 hour spent on the day of the visit reviewing outside records, evaluating patient, coordinating care with treatment center and DME equipment and documenting visit.   Options for treatment and follow-up care were reviewed with the patient and/or guardian. Onel Valdez and/or guardian engaged in the decision making process and verbalized understanding of the options discussed and agreed with the final plan.    Silvia Jensen MD  [facility]   Pager: [phone]

## 2023-03-02 ENCOUNTER — LAB (OUTPATIENT)
Dept: LAB | Facility: CLINIC | Age: 58
End: 2023-03-02
Payer: COMMERCIAL

## 2023-03-02 DIAGNOSIS — Z51.81 ENCOUNTER FOR THERAPEUTIC DRUG MONITORING: Primary | ICD-10-CM

## 2023-03-02 LAB
ALT SERPL W P-5'-P-CCNC: 12 U/L (ref 10–50)
AST SERPL W P-5'-P-CCNC: 19 U/L (ref 10–50)

## 2023-03-02 PROCEDURE — 84450 TRANSFERASE (AST) (SGOT): CPT

## 2023-03-02 PROCEDURE — 84460 ALANINE AMINO (ALT) (SGPT): CPT

## 2023-03-02 PROCEDURE — 36415 COLL VENOUS BLD VENIPUNCTURE: CPT

## 2023-03-15 DIAGNOSIS — I10 BENIGN ESSENTIAL HYPERTENSION: ICD-10-CM

## 2023-03-15 DIAGNOSIS — Z86.73 HISTORY OF STROKE: ICD-10-CM

## 2023-03-16 RX ORDER — LISINOPRIL 20 MG/1
20 TABLET ORAL DAILY
Qty: 30 TABLET | Refills: 1 | Status: SHIPPED | OUTPATIENT
Start: 2023-03-16 | End: 2023-03-30

## 2023-03-30 DIAGNOSIS — I10 BENIGN ESSENTIAL HYPERTENSION: ICD-10-CM

## 2023-03-30 DIAGNOSIS — J44.9 CHRONIC OBSTRUCTIVE PULMONARY DISEASE, UNSPECIFIED COPD TYPE (H): ICD-10-CM

## 2023-03-30 DIAGNOSIS — Z86.73 HISTORY OF STROKE: ICD-10-CM

## 2023-03-30 DIAGNOSIS — F31.81 BIPOLAR 2 DISORDER (H): ICD-10-CM

## 2023-03-30 RX ORDER — OLANZAPINE 5 MG/1
5 TABLET ORAL DAILY PRN
Qty: 20 TABLET | Refills: 0 | Status: SHIPPED | OUTPATIENT
Start: 2023-03-30

## 2023-03-30 RX ORDER — LISINOPRIL 20 MG/1
20 TABLET ORAL DAILY
Qty: 30 TABLET | Refills: 0 | Status: SHIPPED | OUTPATIENT
Start: 2023-03-30 | End: 2023-04-07 | Stop reason: DRUGHIGH

## 2023-03-30 RX ORDER — ATORVASTATIN CALCIUM 40 MG/1
40 TABLET, FILM COATED ORAL DAILY
Qty: 30 TABLET | Refills: 0 | Status: SHIPPED | OUTPATIENT
Start: 2023-03-30 | End: 2023-04-07

## 2023-04-07 ENCOUNTER — OFFICE VISIT (OUTPATIENT)
Dept: FAMILY MEDICINE | Facility: CLINIC | Age: 58
End: 2023-04-07
Payer: COMMERCIAL

## 2023-04-07 VITALS
RESPIRATION RATE: 16 BRPM | SYSTOLIC BLOOD PRESSURE: 137 MMHG | DIASTOLIC BLOOD PRESSURE: 87 MMHG | WEIGHT: 191 LBS | HEIGHT: 67 IN | BODY MASS INDEX: 29.98 KG/M2

## 2023-04-07 DIAGNOSIS — Z86.73 HISTORY OF STROKE: ICD-10-CM

## 2023-04-07 DIAGNOSIS — F10.21 ALCOHOL USE DISORDER, SEVERE, IN EARLY REMISSION (H): ICD-10-CM

## 2023-04-07 DIAGNOSIS — I10 BENIGN ESSENTIAL HYPERTENSION: ICD-10-CM

## 2023-04-07 DIAGNOSIS — F10.91 ALCOHOL USE DISORDER IN REMISSION: ICD-10-CM

## 2023-04-07 DIAGNOSIS — F31.81 BIPOLAR 2 DISORDER (H): ICD-10-CM

## 2023-04-07 DIAGNOSIS — G40.909 SEIZURE DISORDER (H): ICD-10-CM

## 2023-04-07 DIAGNOSIS — J44.9 CHRONIC OBSTRUCTIVE PULMONARY DISEASE, UNSPECIFIED COPD TYPE (H): Primary | ICD-10-CM

## 2023-04-07 LAB
ANION GAP SERPL CALCULATED.3IONS-SCNC: 12 MMOL/L (ref 7–15)
BUN SERPL-MCNC: 9.4 MG/DL (ref 6–20)
CALCIUM SERPL-MCNC: 9.7 MG/DL (ref 8.6–10)
CHLORIDE SERPL-SCNC: 102 MMOL/L (ref 98–107)
CREAT SERPL-MCNC: 0.89 MG/DL (ref 0.67–1.17)
DEPRECATED HCO3 PLAS-SCNC: 24 MMOL/L (ref 22–29)
GFR SERPL CREATININE-BSD FRML MDRD: >90 ML/MIN/1.73M2
GLUCOSE SERPL-MCNC: 91 MG/DL (ref 70–99)
POTASSIUM SERPL-SCNC: 4.8 MMOL/L (ref 3.4–5.3)
SODIUM SERPL-SCNC: 138 MMOL/L (ref 136–145)

## 2023-04-07 PROCEDURE — 99214 OFFICE O/P EST MOD 30 MIN: CPT | Mod: GC | Performed by: STUDENT IN AN ORGANIZED HEALTH CARE EDUCATION/TRAINING PROGRAM

## 2023-04-07 PROCEDURE — 80048 BASIC METABOLIC PNL TOTAL CA: CPT | Performed by: STUDENT IN AN ORGANIZED HEALTH CARE EDUCATION/TRAINING PROGRAM

## 2023-04-07 PROCEDURE — 36415 COLL VENOUS BLD VENIPUNCTURE: CPT | Performed by: STUDENT IN AN ORGANIZED HEALTH CARE EDUCATION/TRAINING PROGRAM

## 2023-04-07 RX ORDER — OLANZAPINE 10 MG/1
TABLET ORAL
COMMUNITY
Start: 2023-02-22

## 2023-04-07 RX ORDER — TIOTROPIUM BROMIDE INHALATION SPRAY 3.12 UG/1
2 SPRAY, METERED RESPIRATORY (INHALATION) DAILY
Qty: 4 G | Refills: 3 | Status: SHIPPED | OUTPATIENT
Start: 2023-04-07 | End: 2023-06-28

## 2023-04-07 RX ORDER — NALTREXONE HYDROCHLORIDE 50 MG/1
50 TABLET, FILM COATED ORAL DAILY
Qty: 90 TABLET | Refills: 1 | Status: SHIPPED | OUTPATIENT
Start: 2023-04-07 | End: 2023-06-28

## 2023-04-07 RX ORDER — ASPIRIN 81 MG
81 TABLET,CHEWABLE ORAL DAILY
Qty: 90 TABLET | Refills: 3 | Status: SHIPPED | OUTPATIENT
Start: 2023-04-07

## 2023-04-07 RX ORDER — LISINOPRIL 20 MG/1
1 TABLET ORAL DAILY
COMMUNITY
Start: 2023-01-19 | End: 2023-04-07

## 2023-04-07 RX ORDER — LISINOPRIL AND HYDROCHLOROTHIAZIDE 12.5; 2 MG/1; MG/1
1 TABLET ORAL DAILY
Qty: 90 TABLET | Refills: 1 | Status: SHIPPED | OUTPATIENT
Start: 2023-04-07

## 2023-04-07 RX ORDER — LISINOPRIL 20 MG/1
20 TABLET ORAL DAILY
Status: CANCELLED | OUTPATIENT
Start: 2023-04-07

## 2023-04-07 RX ORDER — RISPERIDONE 2 MG/1
2 TABLET ORAL 2 TIMES DAILY
Qty: 60 TABLET | Refills: 0 | Status: SHIPPED | OUTPATIENT
Start: 2023-04-07

## 2023-04-07 RX ORDER — DIVALPROEX SODIUM 500 MG/1
500 TABLET, DELAYED RELEASE ORAL 2 TIMES DAILY
Qty: 90 TABLET | Refills: 1 | Status: SHIPPED | OUTPATIENT
Start: 2023-04-07 | End: 2023-06-28

## 2023-04-07 RX ORDER — BUDESONIDE AND FORMOTEROL FUMARATE DIHYDRATE 160; 4.5 UG/1; UG/1
AEROSOL RESPIRATORY (INHALATION)
Qty: 20.4 G | Refills: 11 | Status: SHIPPED | OUTPATIENT
Start: 2023-04-07 | End: 2024-02-28

## 2023-04-07 RX ORDER — NALTREXONE HYDROCHLORIDE 50 MG/1
TABLET, FILM COATED ORAL
COMMUNITY
Start: 2023-03-07 | End: 2023-04-07

## 2023-04-07 RX ORDER — ATORVASTATIN CALCIUM 40 MG/1
40 TABLET, FILM COATED ORAL DAILY
Qty: 90 TABLET | Refills: 3 | Status: SHIPPED | OUTPATIENT
Start: 2023-04-07

## 2023-04-07 RX ORDER — ALBUTEROL SULFATE 90 UG/1
2 AEROSOL, METERED RESPIRATORY (INHALATION) EVERY 6 HOURS PRN
Qty: 18 G | Refills: 3 | Status: SHIPPED | OUTPATIENT
Start: 2023-04-07 | End: 2023-06-28

## 2023-04-07 NOTE — PROGRESS NOTES
Assessment & Plan     Chronic obstructive pulmonary disease, unspecified COPD type (H)  Pt has been out of his inhalers for nearly two weeks, other than his albuterol inhaler. Tolerating spiriva and symbicort well prior to that. Would like him to restart these inhalers prior to increasing therapy. Pt does not appear to be in acute exacerbation at this time.   - albuterol (PROAIR HFA/PROVENTIL HFA/VENTOLIN HFA) 108 (90 Base) MCG/ACT inhaler; Inhale 2 puffs into the lungs every 6 hours as needed for shortness of breath  -Refilled spiriva and symbicort inhalers  -Monitor for worsening of symptoms.     Benign essential hypertension  Elevated today, will start combo pill. Recheck was improved, however will still want to increase therapy, in light of risk factors for stroke.   - lisinopril-hydrochlorothiazide (ZESTORETIC) 20-12.5 MG tablet; Take 1 tablet by mouth daily  - Basic metabolic panel; Future  - Basic metabolic panel  -Return to clinic in 2 weeks to recheck    Seizure disorder (H)  Had been out of depakote for about 1 week now, noting that he has daily spells of seizure like activity in which he notices facial numbness, arm numbness, weakness and headache that lasts several minutes. EEG during admission 2/2023 consistent with focal seizure. Pt with history of TBI and encephalomalacia. Was started on depakote given psych history.   - divalproex sodium delayed-release (DEPAKOTE) 500 MG DR tablet; Take 1 tablet (500 mg) by mouth 2 times daily  -Will have patient care staff assist with scheduling [facility] appointment. Phone number again provided in AVS.     History of stroke  Questionable, in 1/2023 presented with sensory disturbance of right side, treated with tenecteplase. MRI brain negative for acute stroke, he left that admission AMA.  Will refill medications for prevention.   - ASPIRIN LOW DOSE 81 MG chewable tablet; Take 1 tablet (81 mg) by mouth daily  - atorvastatin (LIPITOR) 40 MG tablet;  "Take 1 tablet (40 mg) by mouth daily    Alcohol use disorder, severe, in early remission (H)  Alcohol use disorder in remission  Recently completed inpatient treatment at [facility], was there for approx 6 weeks. Was started on naltrexone. Feels well, denies cravings today.   - naltrexone (DEPADE/REVIA) 50 MG tablet; Take 1 tablet (50 mg) by mouth daily    Bipolar 2 disorder (H)  Pt notes hearing voices that are not there. Does not appear to have diagnosis of schizophrenia. Pt does have appointment with psychiatry in 1 week, hope that they can clarify his diagnoses. Pt has been off of his olanzapine and risperdal. Plan today to restart risperdal.   -Depakote as above.   - risperiDONE (RISPERDAL) 2 MG tablet; Take 1 tablet (2 mg) by mouth 2 times daily  -Maintain psychiatry follow up                 BMI:   Estimated body mass index is 29.98 kg/m  as calculated from the following:    Height as of this encounter: 1.7 m (5' 6.93\").    Weight as of this encounter: 86.6 kg (191 lb).       FUTURE APPOINTMENTS:       - Follow-up visit in 2 weeks    No follow-ups on file.    DO MARLEY Hernandez [facility]    Precepted with Dr. Larissa Rich,     [facility]   Onel is a 57 year old male with PMH of frontal encephalomalacia, alcohol abuse, COPD, HTN, and bipolar disorder, seizures, schizophrenia, who presents for    presenting for the following health issues:  Recheck Medication (Like to get medication for [facility] ) and Breathing         2/22/2023    10:08 AM   Additional Questions   Roomed by Otilia   Accompanied by Self     HPI     Alcohol use disorder  Had just gotten out of treatment for alcohol use last week at [facility]. Was there for about a month and a half. Doing well now. Taking some medications that has been\" taking the urges away\". Per chart review this is naltrexone. He says that he ran out of a lot of his medications while in treatment and was " "unable to get refills. As of now, all he is taking is lisinopril and tylenol for headaches.     Seizures  Ran out of depakote. Has been having facial numbness, which he thinks is part of his seizures. Gets facial numbness, headahce, feels like he might fall down, gets hand numbness. Thinks related to history of several strokes. Seizure activity last about 5 min. Afterwards feels very drained.     COPD  Had run out of his inhalers recently because he's been using them so frequently. Denies any increased sputum. No fevers. Finds that walking up stairs is very challenging. Has not had all three of his inhalers for the past week and a half. No chest pain, no dyspnea at rest. No leg swelling. Only using albuterol inhaler at this time.     Auditory hallucinations  Does note hearing voices that are not there. Was on zyprexa and risperdal but thinks those medications made him sleepy. Does have appointment with psychiatry in 1 week. Thinks that they are manageable today, usually he is able to ignore them. When he is on meds the voices sound muffled.     Blood pressure:   Requesting refills of lisinopril. BP elevated. Has been taking lsinopril 20mg daily. No symptoms today of high blood pressure.       Review of Systems         Objective    BP (!) 158/82   Resp 16   Ht 1.7 m (5' 6.93\")   Wt 86.6 kg (191 lb)   BMI 29.98 kg/m    Body mass index is 29.98 kg/m .  Physical Exam   GENERAL: healthy, alert and no distress  NECK: no adenopathy, no asymmetry, masses, or scars and thyroid normal to palpation  RESP: lungs diminished bilaterally. - no rales, rhonchi or wheezes. No increased work of breathing.   CV: regular rate and rhythm, normal S1 S2, no S3 or S4, no murmur, click or rub, no peripheral edema and peripheral pulses strong  ABDOMEN: soft, nontender, no hepatosplenomegaly, no masses and bowel sounds normal  MS: no gross musculoskeletal defects noted, no edema  SKIN: no suspicious lesions or rashes  PSYCH: mentation " appears normal, affect normal/bright

## 2023-04-07 NOTE — PATIENT INSTRUCTIONS
Please call [phone] to make an appt with the [facility] for follow up of medication and seizures

## 2023-04-07 NOTE — LETTER
April 12, 2023      Onel Valdez  [address] [place] [address]        Dear ,    We are writing to inform you of your test results.    Onel,     Your electrolytes and kidney function look great! Keep up the good work. We will see you in clinic for your next scheduled appointment.     Resulted Orders   Basic metabolic panel   Result Value Ref Range    Sodium 138 136 - 145 mmol/L    Potassium 4.8 3.4 - 5.3 mmol/L    Chloride 102 98 - 107 mmol/L    Carbon Dioxide (CO2) 24 22 - 29 mmol/L    Anion Gap 12 7 - 15 mmol/L    Urea Nitrogen 9.4 6.0 - 20.0 mg/dL    Creatinine 0.89 0.67 - 1.17 mg/dL    Calcium 9.7 8.6 - 10.0 mg/dL    Glucose 91 70 - 99 mg/dL    GFR Estimate >90 >60 mL/min/1.73m2      Comment:      eGFR calculated using 2021 CKD-EPI equation.       If you have any questions or concerns, please call the clinic at the number listed above.       Sincerely,      Eddi Vu MD

## 2023-04-14 NOTE — PROGRESS NOTES
Preceptor Attestation:   Patient seen, evaluated and discussed with the resident. I have verified the content of the note, which accurately reflects my assessment of the patient and the plan of care.  Supervising Physician:Larissa Rich DO [facility]

## 2023-05-01 ENCOUNTER — OFFICE VISIT (OUTPATIENT)
Dept: FAMILY MEDICINE | Facility: CLINIC | Age: 58
End: 2023-05-01
Payer: COMMERCIAL

## 2023-05-01 VITALS
HEIGHT: 66 IN | DIASTOLIC BLOOD PRESSURE: 79 MMHG | BODY MASS INDEX: 32.5 KG/M2 | TEMPERATURE: 97.5 F | WEIGHT: 202.25 LBS | RESPIRATION RATE: 18 BRPM | SYSTOLIC BLOOD PRESSURE: 126 MMHG | OXYGEN SATURATION: 95 % | HEART RATE: 85 BPM

## 2023-05-01 DIAGNOSIS — J43.9 PULMONARY EMPHYSEMA, UNSPECIFIED EMPHYSEMA TYPE (H): Primary | ICD-10-CM

## 2023-05-01 DIAGNOSIS — J30.2 SEASONAL ALLERGIC RHINITIS, UNSPECIFIED TRIGGER: ICD-10-CM

## 2023-05-01 DIAGNOSIS — G40.909 SEIZURE DISORDER (H): ICD-10-CM

## 2023-05-01 PROCEDURE — 99214 OFFICE O/P EST MOD 30 MIN: CPT | Mod: GC | Performed by: STUDENT IN AN ORGANIZED HEALTH CARE EDUCATION/TRAINING PROGRAM

## 2023-05-01 PROCEDURE — 36415 COLL VENOUS BLD VENIPUNCTURE: CPT | Performed by: STUDENT IN AN ORGANIZED HEALTH CARE EDUCATION/TRAINING PROGRAM

## 2023-05-01 PROCEDURE — 80164 ASSAY DIPROPYLACETIC ACD TOT: CPT | Performed by: STUDENT IN AN ORGANIZED HEALTH CARE EDUCATION/TRAINING PROGRAM

## 2023-05-01 RX ORDER — FLUTICASONE PROPIONATE 50 MCG
2 SPRAY, SUSPENSION (ML) NASAL DAILY
Qty: 18.2 ML | Refills: 1 | Status: SHIPPED | OUTPATIENT
Start: 2023-05-01 | End: 2023-05-02

## 2023-05-01 RX ORDER — MONTELUKAST SODIUM 10 MG/1
10 TABLET ORAL AT BEDTIME
Qty: 90 TABLET | Refills: 1 | Status: SHIPPED | OUTPATIENT
Start: 2023-05-01 | End: 2023-05-02

## 2023-05-01 ASSESSMENT — PATIENT HEALTH QUESTIONNAIRE - PHQ9
SUM OF ALL RESPONSES TO PHQ QUESTIONS 1-9: 20
5. POOR APPETITE OR OVEREATING: NEARLY EVERY DAY

## 2023-05-01 ASSESSMENT — ANXIETY QUESTIONNAIRES
GAD7 TOTAL SCORE: 21
5. BEING SO RESTLESS THAT IT IS HARD TO SIT STILL: NEARLY EVERY DAY
3. WORRYING TOO MUCH ABOUT DIFFERENT THINGS: NEARLY EVERY DAY
7. FEELING AFRAID AS IF SOMETHING AWFUL MIGHT HAPPEN: NEARLY EVERY DAY
6. BECOMING EASILY ANNOYED OR IRRITABLE: NEARLY EVERY DAY
1. FEELING NERVOUS, ANXIOUS, OR ON EDGE: NEARLY EVERY DAY
GAD7 TOTAL SCORE: 21
2. NOT BEING ABLE TO STOP OR CONTROL WORRYING: NEARLY EVERY DAY

## 2023-05-01 NOTE — PROGRESS NOTES
CHIEF COMPLAINT                                                      Chief Complaint   Patient presents with     Follow Up     Seizure & CPOD       ASSESSMENT/PLAN:     (J43.9) Pulmonary emphysema, unspecified emphysema type (H)  (primary encounter diagnosis)  (J30.2) Seasonal allergic rhinitis, unspecified trigger  Comment: Back on Spiriva and ICS/LABA for past 2 weeks after being without for the previous 2 weeks.  States breathing is slightly better, though feels like allergies are causing him to cough a lot.  CAT score of 34, indicating poor control.  Sounds nasally congested today and has coarse breath sounds diffusely, consistent with upper airway phlegm and postnasal drip.  Plan:   -Start montelukast (SINGULAIR) 10 MG tablet  -Start fluticasone (FLONASE) 50 MCG/ACT nasal spray  -Follow-up 4 weeks    (G40.879) Seizure disorder (H)  Comment: Reports taking 500 mg Depakote in the morning, 750 mg in the evening after recent dose increase from his neurologist at [facility] [place] (previously 500 mg twice daily).  States this has helped in preventing evening seizures, though is still having daily seizures in the morning.  Wondering if he should increase his Depakote dose in the morning.  We will check a level today to see where he is at.  I advised he reach out to his neurology team with this question.  If he is not able to get a hold of them by the end of the week, I will plan to increase the dose myself, pending levels.  Took Depakote this morning at 6:30 AM.  Plan:   -Valproic acid level        Options for treatment and follow-up care were reviewed with the patient and/or guardian. Onel Valdez and/or guardian engaged in the decision making process and verbalized understanding of the options discussed and agreed with the final plan    Precepted with Dr. Gonzalez.    Don Doe MD - PGY3  [facility]      SUBJECTIVE:                                                    Onel Valdez is a 57  "year old year old male who presents to clinic today for the following health issues:    F/u COPD  Breathing slightly better since restarting inhalers, but not noticeably different  Using spiriva 2 puffs 3x per day  Symbicort is 2 puffs 4 times per day  Thinks he has allergies, doesn't take anything for this  States he has been pretty wheezy, lots of nocturnal cough  Endorses MARTINI - needs albuterol inhaler for exercise induced component    Seizure follow up  3 at night for 750mg, 2 in morning for 500mg  Has still had seizures since restarting meds  Last for about 5 minutes - numb and weak on right side, facial and tongue numbness, nausea  Was previously having seizures both in the morning and at night. Now not having seizures at night with higher nighttime dose  Took depakote at 6:30AM this AM  Follows with [facility] in [place]      From Dr. Longo' note 4/7/23:   \"Seizure disorder (H)  Had been out of depakote for about 1 week now, noting that he has daily spells of seizure like activity in which he notices facial numbness, arm numbness, weakness and headache that lasts several minutes. EEG during admission 2/2023 consistent with focal seizure. Pt with history of TBI and encephalomalacia. Was started on depakote given psych history.   - divalproex sodium delayed-release (DEPAKOTE) 500 MG DR tablet; Take 1 tablet (500 mg) by mouth 2 times daily  -Will have patient care staff assist with scheduling [facility] appointment. Phone number again provided in AVS. \"    ----------------------------------------------------------------------------------------------------------------------  Patient Active Problem List   Diagnosis     Asthma     Foot Pain (Soft Tissue)     Chronic Rhinitis     Headache     Obstructive Sleep Apnea     Chronic obstructive pulmonary disease with acute exacerbation (H)     Multiple fractures of ribs of left side     Clavicle fracture     Anxiety     Concussion with loss of consciousness of " 30 minutes or less, subsequent encounter     Essential hypertension     Other and unspecified alcohol dependence, unspecified drinking behavior     Tobacco use disorder     Asthma, currently active     Past Surgical History:   Procedure Laterality Date     CATARACT EXTRACTION       CLAVICLE SURGERY      fixation for fracture       Social History     Tobacco Use     Smoking status: Every Day     Packs/day: 0.50     Types: Cigarettes     Smokeless tobacco: Never   Vaping Use     Vaping status: Never Used   Substance Use Topics     Alcohol use: Yes     Comment: more than once a week     Family History   Problem Relation Age of Onset     Diabetes Mother      Cerebrovascular Disease Mother      Heart Disease Father      Sleep Apnea Father      Seizure Disorder Brother      Cerebrovascular Disease Brother          Problem list and past medical, surgical, social, and family histories reviewed & adjusted, as indicated.    Current Outpatient Medications   Medication Sig Dispense Refill     acetaminophen (TYLENOL) 500 MG tablet Take 1-2 tablets (500-1,000 mg) by mouth every 8 hours as needed for mild pain Do not take more than 3,000 mg in 24 hours. 30 tablet 0     albuterol (PROAIR HFA/PROVENTIL HFA/VENTOLIN HFA) 108 (90 Base) MCG/ACT inhaler Inhale 2 puffs into the lungs every 6 hours as needed for shortness of breath 18 g 3     albuterol (PROVENTIL) (2.5 MG/3ML) 0.083% neb solution Take 1 vial (2.5 mg) by nebulization every 4 hours as needed for shortness of breath or wheezing 450 mL 3     ASPIRIN LOW DOSE 81 MG chewable tablet Take 1 tablet (81 mg) by mouth daily 90 tablet 3     atorvastatin (LIPITOR) 40 MG tablet Take 1 tablet (40 mg) by mouth daily 90 tablet 3     budesonide-formoterol (SYMBICORT) 160-4.5 MCG/ACT Inhaler Inhale 2 puffs twice daily plus 1-2 puffs as needed. May use up to 12 puffs per day. 20.4 g 11     divalproex sodium delayed-release (DEPAKOTE) 500 MG DR tablet Take 1 tablet (500 mg) by mouth 2 times  "daily 90 tablet 1     lisinopril-hydrochlorothiazide (ZESTORETIC) 20-12.5 MG tablet Take 1 tablet by mouth daily 90 tablet 1     naltrexone (DEPADE/REVIA) 50 MG tablet Take 1 tablet (50 mg) by mouth daily 90 tablet 1     OLANZapine (ZYPREXA) 10 MG tablet        OLANZapine (ZYPREXA) 5 MG tablet Take 1 tablet (5 mg) by mouth daily as needed (voices) 20 tablet 0     predniSONE (DELTASONE) 20 MG tablet Take 2 tablets (40 mg) by mouth daily 10 tablet 0     risperiDONE (RISPERDAL) 2 MG tablet Take 1 tablet (2 mg) by mouth 2 times daily 60 tablet 0     sertraline (ZOLOFT) 50 MG tablet        SUMAtriptan (IMITREX) 50 MG tablet Take 1 tablet (50 mg) by mouth at onset of headache for migraine May repeat in 2 hours. Max 4 tablets/24 hours. 12 tablet 2     tiotropium (SPIRIVA RESPIMAT) 2.5 MCG/ACT inhaler Inhale 2 puffs into the lungs daily 4 g 3     Medication list reviewed and updated as indicated.    Allergies   Allergen Reactions     Amoxicillin-Pot Clavulanate Hives and Shortness Of Breath     swollen lips, itchiness,swollen throat     Amoxicillin Hives     Doxycycline      Erythromycin Unknown     Allergies reviewed and updated as indicated.  ----------------------------------------------------------------------------------------------------------------------  ROS:  Constitutional, HEENT, cardiovascular, pulmonary, GI, musculoskeletal, neuro, skin, and psych systems are negative, except as otherwise noted.    OBJECTIVE:     /79   Pulse 85   Temp 97.5  F (36.4  C) (Oral)   Resp 18   Ht 1.676 m (5' 6\")   Wt 91.7 kg (202 lb 4 oz)   SpO2 95%   BMI 32.64 kg/m    Body mass index is 32.64 kg/m .  Exam:  Constitutional: healthy, alert and no distress  Head: Normocephalic. Atraumatic  Neck: Neck supple. FROM  Cardiovascular: Acyanotic  Respiratory: Normal chest rise. Non-labored breathing. Diffuse expiratory coarse breath sounds, no overt wheeze. Sounds nasally congested  Musculoskeletal: extremities normal- no gross " deformities noted, gait normal and normal muscle tone  Skin: no suspicious lesions or rashes  Neurologic: Gait normal. CN II-XII grossly intact  Psychiatric: mentation appears normal and affect normal/bright

## 2023-05-02 DIAGNOSIS — J30.2 SEASONAL ALLERGIC RHINITIS, UNSPECIFIED TRIGGER: ICD-10-CM

## 2023-05-02 LAB — VALPROATE SERPL-MCNC: 52 UG/ML

## 2023-05-02 RX ORDER — MONTELUKAST SODIUM 10 MG/1
10 TABLET ORAL AT BEDTIME
Qty: 90 TABLET | Refills: 1 | Status: SHIPPED | OUTPATIENT
Start: 2023-05-02

## 2023-05-02 RX ORDER — FLUTICASONE PROPIONATE 50 MCG
2 SPRAY, SUSPENSION (ML) NASAL DAILY
Qty: 18.2 ML | Refills: 1 | Status: SHIPPED | OUTPATIENT
Start: 2023-05-02

## 2023-05-31 ENCOUNTER — OFFICE VISIT (OUTPATIENT)
Dept: FAMILY MEDICINE | Facility: CLINIC | Age: 58
End: 2023-05-31
Payer: COMMERCIAL

## 2023-05-31 ENCOUNTER — ANCILLARY PROCEDURE (OUTPATIENT)
Dept: GENERAL RADIOLOGY | Facility: CLINIC | Age: 58
End: 2023-05-31
Attending: FAMILY MEDICINE
Payer: COMMERCIAL

## 2023-05-31 VITALS
OXYGEN SATURATION: 95 % | DIASTOLIC BLOOD PRESSURE: 75 MMHG | RESPIRATION RATE: 20 BRPM | HEIGHT: 68 IN | SYSTOLIC BLOOD PRESSURE: 120 MMHG | TEMPERATURE: 97.5 F | WEIGHT: 198 LBS | HEART RATE: 98 BPM | BODY MASS INDEX: 30.01 KG/M2

## 2023-05-31 DIAGNOSIS — G40.909 SEIZURE DISORDER (H): ICD-10-CM

## 2023-05-31 DIAGNOSIS — J18.9 PNEUMONIA OF LEFT LOWER LOBE DUE TO INFECTIOUS ORGANISM: Primary | ICD-10-CM

## 2023-05-31 DIAGNOSIS — J44.1 CHRONIC OBSTRUCTIVE PULMONARY DISEASE WITH ACUTE EXACERBATION (H): ICD-10-CM

## 2023-05-31 DIAGNOSIS — F10.91 ALCOHOL USE DISORDER IN REMISSION: ICD-10-CM

## 2023-05-31 DIAGNOSIS — J44.9 CHRONIC OBSTRUCTIVE PULMONARY DISEASE, UNSPECIFIED COPD TYPE (H): ICD-10-CM

## 2023-05-31 PROCEDURE — 99214 OFFICE O/P EST MOD 30 MIN: CPT | Mod: GC

## 2023-05-31 PROCEDURE — 71046 X-RAY EXAM CHEST 2 VIEWS: CPT | Mod: TC | Performed by: RADIOLOGY

## 2023-05-31 RX ORDER — AZITHROMYCIN 250 MG/1
TABLET, FILM COATED ORAL
Qty: 8 TABLET | Refills: 0 | Status: SHIPPED | OUTPATIENT
Start: 2023-05-31 | End: 2023-06-07

## 2023-05-31 RX ORDER — CEFPODOXIME PROXETIL 200 MG/1
200 TABLET, FILM COATED ORAL 2 TIMES DAILY
Qty: 14 TABLET | Refills: 0 | Status: SHIPPED | OUTPATIENT
Start: 2023-05-31 | End: 2023-06-01

## 2023-05-31 RX ORDER — DIVALPROEX SODIUM 250 MG/1
TABLET, DELAYED RELEASE ORAL
COMMUNITY
Start: 2023-05-28

## 2023-05-31 RX ORDER — NALTREXONE HYDROCHLORIDE 50 MG/1
100 TABLET, FILM COATED ORAL DAILY
Qty: 60 TABLET | Refills: 3 | Status: SHIPPED | OUTPATIENT
Start: 2023-05-31

## 2023-05-31 ASSESSMENT — ANXIETY QUESTIONNAIRES
2. NOT BEING ABLE TO STOP OR CONTROL WORRYING: NEARLY EVERY DAY
1. FEELING NERVOUS, ANXIOUS, OR ON EDGE: NEARLY EVERY DAY
6. BECOMING EASILY ANNOYED OR IRRITABLE: NEARLY EVERY DAY
IF YOU CHECKED OFF ANY PROBLEMS ON THIS QUESTIONNAIRE, HOW DIFFICULT HAVE THESE PROBLEMS MADE IT FOR YOU TO DO YOUR WORK, TAKE CARE OF THINGS AT HOME, OR GET ALONG WITH OTHER PEOPLE: NOT DIFFICULT AT ALL
7. FEELING AFRAID AS IF SOMETHING AWFUL MIGHT HAPPEN: NEARLY EVERY DAY
GAD7 TOTAL SCORE: 21
3. WORRYING TOO MUCH ABOUT DIFFERENT THINGS: NEARLY EVERY DAY
5. BEING SO RESTLESS THAT IT IS HARD TO SIT STILL: NEARLY EVERY DAY
GAD7 TOTAL SCORE: 21

## 2023-05-31 ASSESSMENT — ASTHMA QUESTIONNAIRES: ACT_TOTALSCORE: 7

## 2023-05-31 ASSESSMENT — PATIENT HEALTH QUESTIONNAIRE - PHQ9
5. POOR APPETITE OR OVEREATING: NEARLY EVERY DAY
SUM OF ALL RESPONSES TO PHQ QUESTIONS 1-9: 23

## 2023-05-31 NOTE — PROGRESS NOTES
Assessment & Plan     Pneumonia of left lower lobe due to infectious organism  Patient's presenting symptoms most c/w pneumonia. CXR official read not completed by end of visit; however, will treat empirically for pneumonia. Given multiple allergies, selection for antibiotics limited-- will try azithromycin and cefpodoxime. Close follow up.  - azithromycin (ZITHROMAX) 250 MG tablet; Take 2 tablets (500 mg) by mouth daily for 1 day, THEN 1 tablet (250 mg) daily for 6 days.    Seizure disorder (H)  Hx of seizure disorder, follows with neurology. Per last visit at [facility], increased to 750 mg BID of Depakote. Level in therapeutic range. He does note 1-2 seizure-like episodes since then; however, unclear if it was truly a seizure event. Strongly encouraged to follow-up with neurology. Patient stated he would call to try and get in ASAP. Will also send chart message to neuro clinic.    Alcohol use disorder in remission  Patient has been sober for three months. Notes increased cravings lately. Discussed doubling his naltrexone and to take it during the day, a few hours before he notes the most cravings.   - naltrexone (DEPADE/REVIA) 50 MG tablet; Take 2 tablets (100 mg) by mouth daily    Chronic obstructive pulmonary disease, unspecified COPD type (H)  Current presentation more c/w PNA rather than COPD exacerbation; however, if he is not improving on antibiotics, low threshold to do a prednisone burst.    Depression Screening Follow Up        5/31/2023     1:06 PM   PHQ   PHQ-9 Total Score 23   Q9: Thoughts of better off dead/self-harm past 2 weeks Not at all     Follows with psychiatrist. Is meeting with them on Friday. This provider manages psychotropic medications.    Follow Up Actions Taken  Follow up recommended: Seeing psychiatrist this friday       No follow-ups on file.    Kristan Francois MD  [facility]    Sander Sykes is a 57 year old, presenting for the [facility]  "issues:  RECHECK (Seizure and COPD/Asthma?. Also c/o coughing greens mucus)        2/22/2023    10:08 AM   Additional Questions   Roomed by Otilia   Accompanied by Self     HPI     Follow-up cough  - a few weeks of cough, sputum is now green  - worse when laying down, sleeps better in a chair   - also endorses clear rhinorrhea, fatigue  - no fevers, sore throat, chest pain  - does endorse a history of allergies, notes it has been worse since he has been outside  - singular and flonase prescribed at last visit    Out of symbicort and Spiriva for a few days  Albuterol-- using rescue breather recently; using the neb at night     ---  Follow-up seizures  - most recent seizure Sammy morning, endorses \"dizziness\"  - has been taking Depakote three in the morning and three at night (750-750)  - last saw neurologist a month ago, scheduled to see him in two months  - endorses them more often  - does know the clinic number-- encouraged to reach out    ---  Mental health  Does see psychiatrist- prescribing olanzapine ? unclear  - seeing her this Friday     ----  Alcohol use disorder  - on naltrexone 50mg   - struggling with craving  - still has not had a drink for three months      NNEKA- not taking CPAP, stolen; interested in a new one    Review of Systems   Constitutional, HEENT, cardiovascular, pulmonary, gi and gu systems are negative, except as otherwise noted.      Objective    /75   Pulse 98   Temp 97.5  F (36.4  C)   Resp 20   Ht 1.715 m (5' 7.5\")   Wt 89.8 kg (198 lb)   SpO2 95%   BMI 30.55 kg/m    Body mass index is 30.55 kg/m .  Physical Exam   GENERAL: alert and no distress  NECK: no adenopathy, no asymmetry, masses, or scars and thyroid normal to palpation  RESP: mild diffuse wheezing; crackles in RLL>LLL; moderate air movement, no increased work of breathing  CV: regular rate and rhythm, no murmur  ABDOMEN: soft, nontender, no hepatosplenomegaly, no masses and bowel sounds normal  MS: no gross " musculoskeletal defects noted, no edema  NEURO: Normal strength and tone, mentation intact and speech normal

## 2023-05-31 NOTE — PROGRESS NOTES
Preceptor Attestation:   Patient seen, evaluated and discussed with the resident. I personally reviewed the imaging and agree with the interpretation documented by the resident. I have verified the content of the note, which accurately reflects my assessment of the patient and the plan of care.  Supervising Physician:Josselin Peters MD  [facility]

## 2023-06-01 ENCOUNTER — TELEPHONE (OUTPATIENT)
Dept: FAMILY MEDICINE | Facility: CLINIC | Age: 58
End: 2023-06-01
Payer: COMMERCIAL

## 2023-06-01 DIAGNOSIS — J18.9 PNEUMONIA OF LEFT LOWER LOBE DUE TO INFECTIOUS ORGANISM: ICD-10-CM

## 2023-06-01 RX ORDER — CEFPODOXIME PROXETIL 200 MG/1
200 TABLET, FILM COATED ORAL 2 TIMES DAILY
Qty: 14 TABLET | Status: CANCELLED | OUTPATIENT
Start: 2023-06-01

## 2023-06-01 RX ORDER — CEFDINIR 300 MG/1
300 CAPSULE ORAL 2 TIMES DAILY
Qty: 14 CAPSULE | Refills: 0 | Status: SHIPPED | OUTPATIENT
Start: 2023-06-01 | End: 2023-06-08

## 2023-06-02 NOTE — TELEPHONE ENCOUNTER
Central Prior Authorization Team   Phone: [phone]      PRIOR AUTHORIZATION DENIED    Medication: CEFPODOXIME PROXETIL 200 MG PO TABS  Insurance Company: [employer] - Phone [phone] Fax [phone]  Denial Date: 5/31/2023  Denial Rational:       Appeal Information:     Patient Notified: No

## 2023-06-02 NOTE — TELEPHONE ENCOUNTER
Central Prior Authorization Team   Phone: [phone]      Provider did change the medication to a formulary covered item, to avoid confusion.  I will close this encounter.

## 2023-06-05 ENCOUNTER — OFFICE VISIT (OUTPATIENT)
Dept: FAMILY MEDICINE | Facility: CLINIC | Age: 58
End: 2023-06-05
Payer: COMMERCIAL

## 2023-06-05 VITALS
WEIGHT: 199 LBS | SYSTOLIC BLOOD PRESSURE: 136 MMHG | RESPIRATION RATE: 22 BRPM | HEART RATE: 70 BPM | BODY MASS INDEX: 31.98 KG/M2 | TEMPERATURE: 98.3 F | OXYGEN SATURATION: 92 % | HEIGHT: 66 IN | DIASTOLIC BLOOD PRESSURE: 82 MMHG

## 2023-06-05 DIAGNOSIS — R60.0 BILATERAL LOWER EXTREMITY EDEMA: Primary | ICD-10-CM

## 2023-06-05 PROCEDURE — 36415 COLL VENOUS BLD VENIPUNCTURE: CPT

## 2023-06-05 PROCEDURE — 99214 OFFICE O/P EST MOD 30 MIN: CPT | Mod: GC

## 2023-06-05 PROCEDURE — 80053 COMPREHEN METABOLIC PANEL: CPT

## 2023-06-05 PROCEDURE — 83880 ASSAY OF NATRIURETIC PEPTIDE: CPT

## 2023-06-05 RX ORDER — FUROSEMIDE 20 MG
20 TABLET ORAL DAILY
Qty: 30 TABLET | Refills: 0 | Status: SHIPPED | OUTPATIENT
Start: 2023-06-05 | End: 2023-06-28

## 2023-06-05 NOTE — PROGRESS NOTES
Assessment & Plan     Bilateral lower extremity edema  Onel is a 58 year old male who presents today with bilateral leg and foot swelling with left worse than the right. Due to lower extremity edema and history of dyspnea, sleep wakening, and heart murmur, worsening heart failure is most concerning. Patient was prescribed lasix to reduce swelling in legs as well as to wear compression stockings. Additionally, a CMP, kidney function, and BMP will be done today. Patient will schedule a cardiac echo in order to evaluate heart function. Additionally, patient was advised to schedule an ultrasound of left leg in order to rule out a DVT because his left leg is more swollen than the right. Patient advised to follow up in the next week.   - US Lower Extremity Venous Duplex Bilateral; Future  - Echocardiogram Complete; Future  - furosemide (LASIX) 20 MG tablet; Take 1 tablet (20 mg) by mouth daily  - Compression Sleeve/Stocking Order for DME - ONLY FOR DME  - Comprehensive metabolic panel  - N terminal pro BNP outpatient    Return in about 1 week (around 6/12/2023) for Follow up.    Sherri Nunez, MS3    I was present with the medical student who participated in the service and in the documentation of this note. I have verified the history and personally performed the physical exam and medical decision making, and have verified the content of the note, which accurately reflects my assessment of the patient and the plan of care.     Kristan Francois MD  [facility] Subjective   Onel is a 58 year old, presenting for the following health issues: foot/leg swelling.    Swelling (Both legs) and Medication Reconciliation (Needs attention, only takes some meds not all. Thinking med cause feet swelling)        2/22/2023    10:08 AM   Additional Questions   Roomed by Otilia   Accompanied by Self     HPI   Onel states that he has bilateral swelling in his feet and legs stopping at the level of the  knee. He states that he has reduced mobility at the level of the knee joint and ankle joint. He states that the left leg has more reduced mobility than the right. He reports pain. Onel states that the swelling started a few days ago when his shoes started to not fit. Since the swelling started, Onel states that he has gained 5 pounds. He states that his mobility reduced at this time too. His pain with walking (left leg) started after the swelling and reduced mobility, within the last couple of days. He describes the pain as 8/10 and is sharp in quality when walking. He reports that there is no pain when not putting weight on his legs. The pain is located in his left leg on the medial aspect near the heel. Additionally he states that when he moves his left leg as if to cross it over the right he feels a sharp pain in his medial thigh.     Onel states that his right leg is not as swollen as his left yet but believes the swelling is worsening.    No swelling in his scrotum.     He has tried elevation to bring the swelling down but it has not helped. He takes ibuprofen at night but states that this is not helping.     Onel reports no recent trauma to his legs.      Onel states he is only taking his seizure and blood pressure medications currently. He states that he thinks that his medications have caused the swelling which is why he stopped taking his other medications. He has stopped all medications except for his lisinopril, his inhalers/nebulizers, and his seizure medication (divalproex sodium). He is also still taking cefdinir for his recent pneumonia. He has stopped taking his naltrexone, aspirin, and azithromycin.    He is still using tobacco and smokes a pack a day. He denies any alcohol use.     He states his cough and shortness of breath are stable and have not changed since his leg swelling started. He states he is more short of breath at night.     Onel sees a cardiologist for his heart murmur. He  "states he has a \"double beat.\" He is unsure of the last time he saw his cardiologist- has been at least a year.       Review of Systems   Heart: no racing heart, heart palpitations  Respiratory: SOB  Constitutional, HEENT, cardiovascular, pulmonary, gi and gu systems are negative, except as otherwise noted.      Objective    /82   Pulse 70   Temp 98.3  F (36.8  C)   Resp 22   Ht 1.676 m (5' 6\")   Wt 90.3 kg (199 lb)   SpO2 92%   BMI 32.12 kg/m    Body mass index is 32.12 kg/m .  Physical Exam   GENERAL: healthy, alert and no distress  NECK: JVD not obvious  RESP: wheezes bilaterally, crackles more pronounced on right  CV: regular rate and rhythm, distant sounds  ABDOMEN: soft, nontender, no hepatosplenomegaly, no masses and bowel sounds normal  PSYCH: mentation appears normal, affect normal/bright  Lower extremity: diffuse swelling in region of ankle and foot bilaterally, left more than right. Some superficial wounds on shins. Able to move toes, dorsiflex/plantar flex at the ankle.     "

## 2023-06-07 LAB
ALBUMIN SERPL BCG-MCNC: 4 G/DL (ref 3.5–5.2)
ALP SERPL-CCNC: 55 U/L (ref 40–129)
ALT SERPL W P-5'-P-CCNC: 17 U/L (ref 10–50)
ANION GAP SERPL CALCULATED.3IONS-SCNC: 14 MMOL/L (ref 7–15)
AST SERPL W P-5'-P-CCNC: 24 U/L (ref 10–50)
BILIRUB SERPL-MCNC: 0.2 MG/DL
BUN SERPL-MCNC: 10.3 MG/DL (ref 6–20)
CALCIUM SERPL-MCNC: 9.1 MG/DL (ref 8.6–10)
CHLORIDE SERPL-SCNC: 106 MMOL/L (ref 98–107)
CREAT SERPL-MCNC: 0.87 MG/DL (ref 0.67–1.17)
DEPRECATED HCO3 PLAS-SCNC: 21 MMOL/L (ref 22–29)
GFR SERPL CREATININE-BSD FRML MDRD: >90 ML/MIN/1.73M2
GLUCOSE SERPL-MCNC: 111 MG/DL (ref 70–99)
NT-PROBNP SERPL-MCNC: 198 PG/ML (ref 0–900)
POTASSIUM SERPL-SCNC: 4.1 MMOL/L (ref 3.4–5.3)
PROT SERPL-MCNC: 6.6 G/DL (ref 6.4–8.3)
SODIUM SERPL-SCNC: 141 MMOL/L (ref 136–145)

## 2023-06-09 PROBLEM — G40.909 SEIZURE DISORDER (H): Status: ACTIVE | Noted: 2023-06-09

## 2023-06-28 ENCOUNTER — OFFICE VISIT (OUTPATIENT)
Dept: FAMILY MEDICINE | Facility: CLINIC | Age: 58
End: 2023-06-28
Payer: COMMERCIAL

## 2023-06-28 VITALS
SYSTOLIC BLOOD PRESSURE: 106 MMHG | OXYGEN SATURATION: 97 % | HEIGHT: 67 IN | BODY MASS INDEX: 29.19 KG/M2 | WEIGHT: 186 LBS | HEART RATE: 75 BPM | DIASTOLIC BLOOD PRESSURE: 71 MMHG

## 2023-06-28 DIAGNOSIS — G40.909 SEIZURE DISORDER (H): ICD-10-CM

## 2023-06-28 DIAGNOSIS — J44.9 CHRONIC OBSTRUCTIVE PULMONARY DISEASE, UNSPECIFIED COPD TYPE (H): Primary | ICD-10-CM

## 2023-06-28 DIAGNOSIS — F17.200 TOBACCO USE DISORDER: ICD-10-CM

## 2023-06-28 DIAGNOSIS — R60.0 BILATERAL LOWER EXTREMITY EDEMA: ICD-10-CM

## 2023-06-28 DIAGNOSIS — F10.91 ALCOHOL USE DISORDER IN REMISSION: ICD-10-CM

## 2023-06-28 PROBLEM — I63.9 CVA (CEREBRAL VASCULAR ACCIDENT) (H): Status: ACTIVE | Noted: 2023-01-18

## 2023-06-28 PROBLEM — R56.9 SEIZURE (H): Status: ACTIVE | Noted: 2023-06-09

## 2023-06-28 PROCEDURE — 99214 OFFICE O/P EST MOD 30 MIN: CPT | Mod: GC

## 2023-06-28 PROCEDURE — 80048 BASIC METABOLIC PNL TOTAL CA: CPT

## 2023-06-28 PROCEDURE — 36415 COLL VENOUS BLD VENIPUNCTURE: CPT

## 2023-06-28 RX ORDER — ALBUTEROL SULFATE 0.83 MG/ML
2.5 SOLUTION RESPIRATORY (INHALATION) EVERY 4 HOURS PRN
Qty: 450 ML | Refills: 3 | Status: SHIPPED | OUTPATIENT
Start: 2023-06-28 | End: 2024-01-04

## 2023-06-28 RX ORDER — FUROSEMIDE 20 MG
20 TABLET ORAL DAILY
Qty: 60 TABLET | Refills: 0 | Status: SHIPPED | OUTPATIENT
Start: 2023-06-28

## 2023-06-28 RX ORDER — ALBUTEROL SULFATE 90 UG/1
2 AEROSOL, METERED RESPIRATORY (INHALATION) EVERY 6 HOURS PRN
Qty: 18 G | Refills: 3 | Status: SHIPPED | OUTPATIENT
Start: 2023-06-28 | End: 2023-11-06

## 2023-06-28 RX ORDER — TIOTROPIUM BROMIDE INHALATION SPRAY 3.12 UG/1
2 SPRAY, METERED RESPIRATORY (INHALATION) DAILY
Qty: 4 G | Refills: 3 | Status: SHIPPED | OUTPATIENT
Start: 2023-06-28 | End: 2023-07-12

## 2023-06-28 RX ORDER — DIVALPROEX SODIUM 500 MG/1
500 TABLET, DELAYED RELEASE ORAL 2 TIMES DAILY
Qty: 90 TABLET | Refills: 1 | Status: SHIPPED | OUTPATIENT
Start: 2023-06-28 | End: 2023-06-29

## 2023-06-28 NOTE — LETTER
June 30, 2023      Onel Valdez  [address] [place] [address]        Dear ,    We are writing to inform you of your test results.    Onel,     Your lab work shows no electrolyte abnormalities. You can continue your lasix as prescribed.     Thank you,     Dr. Francois     Resulted Orders   Basic metabolic panel   Result Value Ref Range    Sodium 137 136 - 145 mmol/L    Potassium 4.1 3.4 - 5.3 mmol/L    Chloride 100 98 - 107 mmol/L    Carbon Dioxide (CO2) 25 22 - 29 mmol/L    Anion Gap 12 7 - 15 mmol/L    Urea Nitrogen 12.2 6.0 - 20.0 mg/dL    Creatinine 1.00 0.67 - 1.17 mg/dL    Calcium 9.4 8.6 - 10.0 mg/dL    Glucose 107 (H) 70 - 99 mg/dL    GFR Estimate 88 >60 mL/min/1.73m2       If you have any questions or concerns, please call the clinic at the number listed above.       Sincerely,      José Luis Connor MD

## 2023-06-28 NOTE — PROGRESS NOTES
Assessment & Plan     Bilateral lower extremity edema  Resolved. Will obtain BMP today and continue lasix until his ECHO scheduled in August.  - furosemide (LASIX) 20 MG tablet; Take 1 tablet (20 mg) by mouth daily    Chronic obstructive pulmonary disease, unspecified COPD type (H)  Improved from last visit substantially. Still with some symptoms, likely from air quality.Will refill his medications.   - albuterol (PROAIR HFA/PROVENTIL HFA/VENTOLIN HFA) 108 (90 Base) MCG/ACT inhaler; Inhale 2 puffs into the lungs every 6 hours as needed for shortness of breath  - tiotropium (SPIRIVA RESPIMAT) 2.5 MCG/ACT inhaler; Inhale 2 puffs into the lungs daily  - albuterol (PROVENTIL) (2.5 MG/3ML) 0.083% neb solution; Take 1 vial (2.5 mg) by nebulization every 4 hours as needed for shortness of breath or wheezing  - Basic metabolic panel; Future  - Basic metabolic panel    Seizure disorder (H)  Improved. States he has had one seizure since our last visit. Still encouraged him to get in touch with his neurologist and continue the increased dose of depakote as prescribed (750mg BID). Level was low-therapeutic on the 500-750 regimen.   - divalproex sodium delayed-release (DEPAKOTE) 250 MG DR tablet; Take 3 tablet (750 mg) by mouth 2 times daily    Alcohol use disorder in remission  Remains in remission for almost five months. Currently stable on naltrexone dose.    Tobacco use disorder  Cutting back, now smokes 10 cigarettes a day. Does not want NRT or other supports at the moment.    Return in about 3 months (around 9/28/2023).    Kristan Francois MD  [facility]    Sander Sykes is a 58 year old, presenting for the following health issues:  RECHECK (Altrasound result, asthma, )        2/22/2023    10:08 AM   Additional Questions   Roomed by Otilia   Accompanied by Self     HPI     Lower extremity edema  - much improved   - till taking the lasix   - [facility] on August 10th will have ECHO    Hx  "pneumonia  - breathing is relatively stable, sputum is back to normal after the antibiotics    Alcohol use disorder in remission  - almost five months of remission  - cravings are subsided with naltrexone     Seizures  - no seizures  - will try and connect him with neurology sooner  - continue depakote     Seeing psychiatrist this Friday-- manages antipsychotics unsure of which ones    Smoking-- 10 cigs a day, cutting down!       Review of Systems   Constitutional, HEENT, cardiovascular, pulmonary, gi and gu systems are negative, except as otherwise noted.      Objective    /71   Pulse 75   Ht 1.702 m (5' 7\")   Wt 84.4 kg (186 lb)   SpO2 97%   BMI 29.13 kg/m    Body mass index is 29.13 kg/m .  Physical Exam   GENERAL: alert  NECK: no adenopathy, no asymmetry, masses, or scars and thyroid normal to palpation  RESP: diffuse expiratory wheezing, improved from last visit  CV: regular rate and rhythm, normal S1 S2, no S3 or S4, no murmur, click or rub, no peripheral edema and peripheral pulses strong  ABDOMEN: soft, nontender, no hepatosplenomegaly, no masses and bowel sounds normal  MS: no gross musculoskeletal defects noted, no edema  PSYCH: mentation appears normal, affect normal/bright    "

## 2023-06-29 PROBLEM — F10.91 ALCOHOL USE DISORDER IN REMISSION: Status: ACTIVE | Noted: 2023-06-29

## 2023-06-29 LAB
ANION GAP SERPL CALCULATED.3IONS-SCNC: 12 MMOL/L (ref 7–15)
BUN SERPL-MCNC: 12.2 MG/DL (ref 6–20)
CALCIUM SERPL-MCNC: 9.4 MG/DL (ref 8.6–10)
CHLORIDE SERPL-SCNC: 100 MMOL/L (ref 98–107)
CREAT SERPL-MCNC: 1 MG/DL (ref 0.67–1.17)
DEPRECATED HCO3 PLAS-SCNC: 25 MMOL/L (ref 22–29)
GFR SERPL CREATININE-BSD FRML MDRD: 88 ML/MIN/1.73M2
GLUCOSE SERPL-MCNC: 107 MG/DL (ref 70–99)
POTASSIUM SERPL-SCNC: 4.1 MMOL/L (ref 3.4–5.3)
SODIUM SERPL-SCNC: 137 MMOL/L (ref 136–145)

## 2023-06-29 RX ORDER — DIVALPROEX SODIUM 250 MG/1
750 TABLET, DELAYED RELEASE ORAL 2 TIMES DAILY
Qty: 180 TABLET | Refills: 2 | Status: SHIPPED | OUTPATIENT
Start: 2023-06-29

## 2023-07-10 DIAGNOSIS — J44.9 CHRONIC OBSTRUCTIVE PULMONARY DISEASE, UNSPECIFIED COPD TYPE (H): ICD-10-CM

## 2023-07-10 NOTE — TELEPHONE ENCOUNTER
Message to physician:     Date of last visit: 6/28/2023    Date of next visit if scheduled:     Potassium   Date Value Ref Range Status   06/28/2023 4.1 3.4 - 5.3 mmol/L Final   05/19/2022 3.6 3.5 - 5.0 mmol/L Final     Creatinine   Date Value Ref Range Status   06/28/2023 1.00 0.67 - 1.17 mg/dL Final     GFR Estimate   Date Value Ref Range Status   06/28/2023 88 >60 mL/min/1.73m2 Final       BP Readings from Last 3 Encounters:   06/28/23 106/71   06/05/23 136/82   05/31/23 120/75       Hemoglobin A1C   Date Value Ref Range Status   02/17/2014 5.8 4.2 - 6.1 % Final       Please complete refill and CLOSE ENCOUNTER.  Closing the encounter signifies the refill is complete.

## 2023-07-12 RX ORDER — TIOTROPIUM BROMIDE INHALATION SPRAY 3.12 UG/1
2 SPRAY, METERED RESPIRATORY (INHALATION) DAILY
Qty: 4 G | Refills: 3 | Status: SHIPPED | OUTPATIENT
Start: 2023-07-12 | End: 2023-11-06

## 2023-11-06 ENCOUNTER — TELEPHONE (OUTPATIENT)
Dept: FAMILY MEDICINE | Facility: CLINIC | Age: 58
End: 2023-11-06
Payer: COMMERCIAL

## 2023-11-06 DIAGNOSIS — J44.9 CHRONIC OBSTRUCTIVE PULMONARY DISEASE, UNSPECIFIED COPD TYPE (H): ICD-10-CM

## 2023-11-07 RX ORDER — ALBUTEROL SULFATE 90 UG/1
2 AEROSOL, METERED RESPIRATORY (INHALATION) EVERY 6 HOURS PRN
Qty: 18 G | Refills: 11 | Status: SHIPPED | OUTPATIENT
Start: 2023-11-07

## 2023-11-07 RX ORDER — TIOTROPIUM BROMIDE INHALATION SPRAY 3.12 UG/1
2 SPRAY, METERED RESPIRATORY (INHALATION) DAILY
Qty: 4 G | Refills: 3 | Status: SHIPPED | OUTPATIENT
Start: 2023-11-07

## 2024-01-02 DIAGNOSIS — J44.9 CHRONIC OBSTRUCTIVE PULMONARY DISEASE, UNSPECIFIED COPD TYPE (H): ICD-10-CM

## 2024-01-04 RX ORDER — ALBUTEROL SULFATE 0.83 MG/ML
SOLUTION RESPIRATORY (INHALATION)
Qty: 450 ML | Refills: 3 | Status: SHIPPED | OUTPATIENT
Start: 2024-01-04 | End: 2024-04-30

## 2024-02-28 DIAGNOSIS — J44.9 CHRONIC OBSTRUCTIVE PULMONARY DISEASE, UNSPECIFIED COPD TYPE (H): ICD-10-CM

## 2024-02-29 RX ORDER — BUDESONIDE AND FORMOTEROL FUMARATE DIHYDRATE 160; 4.5 UG/1; UG/1
AEROSOL RESPIRATORY (INHALATION)
Qty: 20.4 G | Refills: 11 | Status: SHIPPED | OUTPATIENT
Start: 2024-02-29 | End: 2024-06-04

## 2024-04-30 DIAGNOSIS — J44.9 CHRONIC OBSTRUCTIVE PULMONARY DISEASE, UNSPECIFIED COPD TYPE (H): ICD-10-CM

## 2024-05-03 RX ORDER — ALBUTEROL SULFATE 0.83 MG/ML
2.5 SOLUTION RESPIRATORY (INHALATION) 3 TIMES DAILY PRN
Qty: 450 ML | Refills: 3 | Status: SHIPPED | OUTPATIENT
Start: 2024-05-03

## 2024-06-04 DIAGNOSIS — J44.9 CHRONIC OBSTRUCTIVE PULMONARY DISEASE, UNSPECIFIED COPD TYPE (H): ICD-10-CM

## 2024-06-07 RX ORDER — BUDESONIDE AND FORMOTEROL FUMARATE DIHYDRATE 160; 4.5 UG/1; UG/1
AEROSOL RESPIRATORY (INHALATION)
Qty: 20.4 G | Refills: 11 | Status: SHIPPED | OUTPATIENT
Start: 2024-06-07 | End: 2024-07-08

## 2024-07-08 DIAGNOSIS — J44.9 CHRONIC OBSTRUCTIVE PULMONARY DISEASE, UNSPECIFIED COPD TYPE (H): ICD-10-CM

## 2024-07-08 NOTE — TELEPHONE ENCOUNTER
[facility] phone call message- medication clarification/question:    Full Medication Name: budesonide-formoterol (SYMBICORT) 160-4.5 MCG/ACT Inhaler       Question: Patient called requesting for refill on medication. If able to fill please send to pharmacy thank you.    Pharmacy confirmed as   "[id number] - [address]: Yes    OK to leave a message on voice mail? No    Primary language: English      needed? No    Call taken on July 8, 2024 at 11:08 AM by Gwendolyn Majano

## 2024-07-09 ENCOUNTER — TELEPHONE (OUTPATIENT)
Dept: FAMILY MEDICINE | Facility: CLINIC | Age: 59
End: 2024-07-09
Payer: COMMERCIAL

## 2024-07-09 RX ORDER — BUDESONIDE AND FORMOTEROL FUMARATE DIHYDRATE 160; 4.5 UG/1; UG/1
AEROSOL RESPIRATORY (INHALATION)
Qty: 20.4 G | Refills: 0 | Status: SHIPPED | OUTPATIENT
Start: 2024-07-09 | End: 2024-09-25

## 2024-07-09 NOTE — TELEPHONE ENCOUNTER
Requested prescription approved. 90 days provided. Please call patient to have him schedule appointment within the next month to re-assess his Symbicort.

## 2024-07-09 NOTE — TELEPHONE ENCOUNTER
[facility] phone call message- medication clarification/question:    Full Medication Name: budesonide-formoterol (SYMBICORT) 160-4.5 MCG/ACT Inhaler        Question: patient called in stating that he has been told he needs Prior Auth on the above medication.  Please send if able -- thank you      Pharmacy confirmed as      [id number] [address]: Yes    OK to leave a message on voice mail? Yes    Primary language: English      needed? No    Call taken on July 9, 2024 at 11:33 AM by Raven Valerio

## 2024-07-31 NOTE — TELEPHONE ENCOUNTER
Medication Requested: Adderall 20mg    Last Rx written: 2-15-21 for 3-15-21  Last Rx dispensed (per PDMP): 3-29-21    Last office visit: 2-15-21  Upcoming office visit: none    Cancelled/No Show appt: 3-8-21  Follow up: 3 months    Patient due, prepped if agreed    Routed to provider/AR for  Review     Patient calling back regarding prior auth for medication, please review and place thank you!

## 2024-08-01 NOTE — TELEPHONE ENCOUNTER
Pharmacy called back stating the quantity reached max allowed per day and needs PA, for insurance to approve. Please review and send PA. Thanks!

## 2024-08-01 NOTE — TELEPHONE ENCOUNTER
Please disregard message for PA, I called the pharmacy and the pharmacist stated the generic brand. Called and notified patient to  medication.

## 2024-08-05 NOTE — TELEPHONE ENCOUNTER
Sorry for the confusion, the pharmacy had called back and clarified why they could not fill the prescription. Yes they need a PA for the quantity, since it is over the amount that they typically approve for. Please review and place PA. Thank you!

## 2024-09-04 ENCOUNTER — TELEPHONE (OUTPATIENT)
Dept: FAMILY MEDICINE | Facility: CLINIC | Age: 59
End: 2024-09-04
Payer: COMMERCIAL

## 2024-09-04 NOTE — TELEPHONE ENCOUNTER
Prior Authorization needed on:      Medication:  Symbicort  Dose:  2 inhalers /month    Pharmacy confirmed as   Phone: [phone] Fax: [phone]    [id number] - [place], [place] - [address] [facility] AT [address]  [address] [facility] [address]  Phone: [phone] Fax: [phone]  : Yes    Insurance Name:  Marilyn Bazzi formerly [facility] September 4, 2024 at 10:38 AM

## 2024-09-05 NOTE — TELEPHONE ENCOUNTER
PA Initiation    Medication: SYMBICORT 160-4.5 MCG/ACT IN AERO  Insurance Company: [employer] - Phone [phone] Fax [phone]  [facility] the Rx[id number] [address]  Filling Pharmacy Phone: [phone]  Filling Pharmacy Fax:    Start Date: 9/5/2024  Retail Pharmacy Prior Authorization Team   Phone: [phone]

## 2024-09-05 NOTE — TELEPHONE ENCOUNTER
Prior Authorization Not Needed per Insurance I sent for 2 Inhalers Per 30 Days    Medication: SYMBICORT 160-4.5 MCG/ACT IN AERO  Insurance Company: [employer] - Phone [phone] Fax [phone]  Expected CoPay: $    Pharmacy Filling the Rx: [facility] #[id number] - [address]  Pharmacy Notified: Yes    Patient Notified: The pharmacy will notify the patient.

## 2024-09-25 DIAGNOSIS — J44.9 CHRONIC OBSTRUCTIVE PULMONARY DISEASE, UNSPECIFIED COPD TYPE (H): ICD-10-CM

## 2024-09-25 RX ORDER — BUDESONIDE AND FORMOTEROL FUMARATE DIHYDRATE 160; 4.5 UG/1; UG/1
AEROSOL RESPIRATORY (INHALATION)
Qty: 20.4 G | Refills: 0 | Status: SHIPPED | OUTPATIENT
Start: 2024-09-25

## 2024-09-25 NOTE — TELEPHONE ENCOUNTER
Requested prescription approved. 90 days provided. He will need an appointment prior to any more refills.

## 2024-09-25 NOTE — TELEPHONE ENCOUNTER
Patient returned call. Relayed message about scheduling a follow up for asthma for future med refills. Patient stated he lives in [place] now and unsure how he would get back to get an appointment. I informed him if that is where he reside now, he would need to find a primary to take over his care near him so that he can have his meds filled in the future. Patient verbalized understanding.

## 2024-09-25 NOTE — TELEPHONE ENCOUNTER
Outside RN standing orders, last refill patient was required to come in for an asthma follow up per PCP, patient did not come in. Routing to PCP to review and fill if appropriate. Charlie MONROE